# Patient Record
Sex: MALE | Race: WHITE | NOT HISPANIC OR LATINO | Employment: FULL TIME | RURAL
[De-identification: names, ages, dates, MRNs, and addresses within clinical notes are randomized per-mention and may not be internally consistent; named-entity substitution may affect disease eponyms.]

---

## 2022-04-15 ENCOUNTER — HOSPITAL ENCOUNTER (OUTPATIENT)
Facility: HOSPITAL | Age: 27
Discharge: HOME | End: 2022-04-17
Admitting: SURGERY
Payer: MEDICAID

## 2022-04-15 ENCOUNTER — ANESTHESIA EVENT (OUTPATIENT)
Dept: SURGERY | Facility: HOSPITAL | Age: 27
End: 2022-04-15
Payer: MEDICAID

## 2022-04-15 DIAGNOSIS — K37 APPENDICITIS, UNSPECIFIED APPENDICITIS TYPE: ICD-10-CM

## 2022-04-15 DIAGNOSIS — K35.30 ACUTE APPENDICITIS WITH LOCALIZED PERITONITIS, WITHOUT PERFORATION, ABSCESS, OR GANGRENE: Primary | ICD-10-CM

## 2022-04-15 DIAGNOSIS — K35.30: ICD-10-CM

## 2022-04-15 LAB
ALBUMIN SERPL BCP-MCNC: 3.9 G/DL (ref 3.5–5)
ALBUMIN/GLOB SERPL: 1.1 {RATIO}
ALP SERPL-CCNC: 76 U/L (ref 45–115)
ALT SERPL W P-5'-P-CCNC: 41 U/L (ref 16–61)
ANION GAP SERPL CALCULATED.3IONS-SCNC: 15 MMOL/L (ref 7–16)
AST SERPL W P-5'-P-CCNC: 22 U/L (ref 15–37)
BASOPHILS # BLD AUTO: 0.02 K/UL (ref 0–0.2)
BASOPHILS NFR BLD AUTO: 0.2 % (ref 0–1)
BILIRUB SERPL-MCNC: 0.3 MG/DL (ref 0–1.2)
BUN SERPL-MCNC: 7 MG/DL (ref 7–18)
BUN/CREAT SERPL: 9 (ref 6–20)
CALCIUM SERPL-MCNC: 9 MG/DL (ref 8.5–10.1)
CHLORIDE SERPL-SCNC: 103 MMOL/L (ref 98–107)
CO2 SERPL-SCNC: 28 MMOL/L (ref 21–32)
CREAT SERPL-MCNC: 0.75 MG/DL (ref 0.7–1.3)
DIFFERENTIAL METHOD BLD: ABNORMAL
EOSINOPHIL # BLD AUTO: 0.1 K/UL (ref 0–0.5)
EOSINOPHIL NFR BLD AUTO: 0.8 % (ref 1–4)
ERYTHROCYTE [DISTWIDTH] IN BLOOD BY AUTOMATED COUNT: 13 % (ref 11.5–14.5)
GLOBULIN SER-MCNC: 3.6 G/DL (ref 2–4)
GLUCOSE SERPL-MCNC: 96 MG/DL (ref 74–106)
HCT VFR BLD AUTO: 40.2 % (ref 40–54)
HGB BLD-MCNC: 14 G/DL (ref 13.5–18)
IMM GRANULOCYTES # BLD AUTO: 0.05 K/UL (ref 0–0.04)
IMM GRANULOCYTES NFR BLD: 0.4 % (ref 0–0.4)
LIPASE SERPL-CCNC: 94 U/L (ref 73–393)
LYMPHOCYTES # BLD AUTO: 2.23 K/UL (ref 1–4.8)
LYMPHOCYTES NFR BLD AUTO: 16.9 % (ref 27–41)
MCH RBC QN AUTO: 30.2 PG (ref 27–31)
MCHC RBC AUTO-ENTMCNC: 34.8 G/DL (ref 32–36)
MCV RBC AUTO: 86.8 FL (ref 80–96)
MONOCYTES # BLD AUTO: 1.08 K/UL (ref 0–0.8)
MONOCYTES NFR BLD AUTO: 8.2 % (ref 2–6)
MPC BLD CALC-MCNC: 8.3 FL (ref 9.4–12.4)
NEUTROPHILS # BLD AUTO: 9.71 K/UL (ref 1.8–7.7)
NEUTROPHILS NFR BLD AUTO: 73.5 % (ref 53–65)
NRBC # BLD AUTO: 0 X10E3/UL
NRBC, AUTO (.00): 0 %
PLATELET # BLD AUTO: 319 K/UL (ref 150–400)
POTASSIUM SERPL-SCNC: 4.1 MMOL/L (ref 3.5–5.1)
PROT SERPL-MCNC: 7.5 G/DL (ref 6.4–8.2)
RBC # BLD AUTO: 4.63 M/UL (ref 4.6–6.2)
SARS-COV-2 RDRP RESP QL NAA+PROBE: NEGATIVE
SODIUM SERPL-SCNC: 142 MMOL/L (ref 136–145)
WBC # BLD AUTO: 13.19 K/UL (ref 4.5–11)

## 2022-04-15 PROCEDURE — 27000509 HC TUBE SALEM SUMP ANY SIZE: Performed by: ANESTHESIOLOGY

## 2022-04-15 PROCEDURE — 27000510 HC BLANKET BAIR HUGGER ANY SIZE: Performed by: ANESTHESIOLOGY

## 2022-04-15 PROCEDURE — 87635 SARS-COV-2 COVID-19 AMP PRB: CPT | Performed by: NURSE PRACTITIONER

## 2022-04-15 PROCEDURE — 88304 TISSUE EXAM BY PATHOLOGIST: CPT | Mod: SUR | Performed by: SURGERY

## 2022-04-15 PROCEDURE — 36000708 HC OR TIME LEV III 1ST 15 MIN: Performed by: SURGERY

## 2022-04-15 PROCEDURE — D9220A PRA ANESTHESIA: ICD-10-PCS | Mod: ANES,,, | Performed by: ANESTHESIOLOGY

## 2022-04-15 PROCEDURE — 85025 COMPLETE CBC W/AUTO DIFF WBC: CPT | Performed by: NURSE PRACTITIONER

## 2022-04-15 PROCEDURE — 44970 LAPAROSCOPY APPENDECTOMY: CPT | Mod: ,,, | Performed by: SURGERY

## 2022-04-15 PROCEDURE — 99283 EMERGENCY DEPT VISIT LOW MDM: CPT | Mod: ,,, | Performed by: NURSE PRACTITIONER

## 2022-04-15 PROCEDURE — 25500020 PHARM REV CODE 255: Performed by: NURSE PRACTITIONER

## 2022-04-15 PROCEDURE — 83690 ASSAY OF LIPASE: CPT | Performed by: NURSE PRACTITIONER

## 2022-04-15 PROCEDURE — 25000003 PHARM REV CODE 250

## 2022-04-15 PROCEDURE — 71000033 HC RECOVERY, INTIAL HOUR: Performed by: SURGERY

## 2022-04-15 PROCEDURE — 27000689 HC BLADE LARYNGOSCOPE ANY SIZE: Performed by: ANESTHESIOLOGY

## 2022-04-15 PROCEDURE — 27000716 HC OXISENSOR PROBE, ANY SIZE: Performed by: ANESTHESIOLOGY

## 2022-04-15 PROCEDURE — D9220A PRA ANESTHESIA: Mod: CRNA,,,

## 2022-04-15 PROCEDURE — 25000003 PHARM REV CODE 250: Performed by: SURGERY

## 2022-04-15 PROCEDURE — 00840 ANES IPER PX LOWER ABD NOS: CPT | Performed by: SURGERY

## 2022-04-15 PROCEDURE — 99285 EMERGENCY DEPT VISIT HI MDM: CPT | Mod: 25

## 2022-04-15 PROCEDURE — 63600175 PHARM REV CODE 636 W HCPCS

## 2022-04-15 PROCEDURE — 37000008 HC ANESTHESIA 1ST 15 MINUTES: Performed by: SURGERY

## 2022-04-15 PROCEDURE — 27000165 HC TUBE, ETT CUFFED: Performed by: ANESTHESIOLOGY

## 2022-04-15 PROCEDURE — 27000655: Performed by: ANESTHESIOLOGY

## 2022-04-15 PROCEDURE — 27000260 *HC AIRWAY ORAL: Performed by: ANESTHESIOLOGY

## 2022-04-15 PROCEDURE — D9220A PRA ANESTHESIA: Mod: ANES,,, | Performed by: ANESTHESIOLOGY

## 2022-04-15 PROCEDURE — 63600175 PHARM REV CODE 636 W HCPCS: Performed by: ANESTHESIOLOGY

## 2022-04-15 PROCEDURE — 36415 COLL VENOUS BLD VENIPUNCTURE: CPT | Performed by: NURSE PRACTITIONER

## 2022-04-15 PROCEDURE — 80053 COMPREHEN METABOLIC PANEL: CPT | Performed by: NURSE PRACTITIONER

## 2022-04-15 PROCEDURE — 27201423 OPTIME MED/SURG SUP & DEVICES STERILE SUPPLY: Performed by: SURGERY

## 2022-04-15 PROCEDURE — 87070 CULTURE OTHR SPECIMN AEROBIC: CPT | Performed by: SURGERY

## 2022-04-15 PROCEDURE — 36000709 HC OR TIME LEV III EA ADD 15 MIN: Performed by: SURGERY

## 2022-04-15 PROCEDURE — D9220A PRA ANESTHESIA: ICD-10-PCS | Mod: CRNA,,,

## 2022-04-15 PROCEDURE — 87075 CULTR BACTERIA EXCEPT BLOOD: CPT | Performed by: SURGERY

## 2022-04-15 PROCEDURE — 99283 PR EMERGENCY DEPT VISIT,LEVEL III: ICD-10-PCS | Mod: ,,, | Performed by: NURSE PRACTITIONER

## 2022-04-15 PROCEDURE — 44970 PR LAP,APPENDECTOMY: ICD-10-PCS | Mod: ,,, | Performed by: SURGERY

## 2022-04-15 PROCEDURE — C1729 CATH, DRAINAGE: HCPCS | Performed by: SURGERY

## 2022-04-15 PROCEDURE — 37000009 HC ANESTHESIA EA ADD 15 MINS: Performed by: SURGERY

## 2022-04-15 PROCEDURE — 88304 SURGICAL PATHOLOGY: ICD-10-PCS | Mod: 26,,, | Performed by: PATHOLOGY

## 2022-04-15 PROCEDURE — 88304 TISSUE EXAM BY PATHOLOGIST: CPT | Mod: 26,,, | Performed by: PATHOLOGY

## 2022-04-15 RX ORDER — MORPHINE SULFATE 10 MG/ML
4 INJECTION INTRAMUSCULAR; INTRAVENOUS; SUBCUTANEOUS EVERY 4 HOURS PRN
Status: DISCONTINUED | OUTPATIENT
Start: 2022-04-15 | End: 2022-04-15

## 2022-04-15 RX ORDER — FENTANYL CITRATE 50 UG/ML
INJECTION, SOLUTION INTRAMUSCULAR; INTRAVENOUS
Status: DISCONTINUED | OUTPATIENT
Start: 2022-04-15 | End: 2022-04-15

## 2022-04-15 RX ORDER — HYDROMORPHONE HYDROCHLORIDE 2 MG/ML
0.5 INJECTION, SOLUTION INTRAMUSCULAR; INTRAVENOUS; SUBCUTANEOUS EVERY 5 MIN PRN
Status: DISCONTINUED | OUTPATIENT
Start: 2022-04-15 | End: 2022-04-15 | Stop reason: HOSPADM

## 2022-04-15 RX ORDER — ROCURONIUM BROMIDE 10 MG/ML
INJECTION, SOLUTION INTRAVENOUS
Status: DISCONTINUED | OUTPATIENT
Start: 2022-04-15 | End: 2022-04-15

## 2022-04-15 RX ORDER — BUPIVACAINE HYDROCHLORIDE 2.5 MG/ML
INJECTION, SOLUTION EPIDURAL; INFILTRATION; INTRACAUDAL
Status: DISCONTINUED | OUTPATIENT
Start: 2022-04-15 | End: 2022-04-15 | Stop reason: HOSPADM

## 2022-04-15 RX ORDER — MEPERIDINE HYDROCHLORIDE 25 MG/ML
25 INJECTION INTRAMUSCULAR; INTRAVENOUS; SUBCUTANEOUS EVERY 10 MIN PRN
Status: DISCONTINUED | OUTPATIENT
Start: 2022-04-15 | End: 2022-04-15 | Stop reason: HOSPADM

## 2022-04-15 RX ORDER — SODIUM CHLORIDE 450 MG/100ML
INJECTION, SOLUTION INTRAVENOUS CONTINUOUS
Status: DISCONTINUED | OUTPATIENT
Start: 2022-04-15 | End: 2022-04-17 | Stop reason: HOSPADM

## 2022-04-15 RX ORDER — KETOROLAC TROMETHAMINE 30 MG/ML
INJECTION, SOLUTION INTRAMUSCULAR; INTRAVENOUS
Status: DISCONTINUED | OUTPATIENT
Start: 2022-04-15 | End: 2022-04-15

## 2022-04-15 RX ORDER — DIPHENHYDRAMINE HYDROCHLORIDE 50 MG/ML
25 INJECTION INTRAMUSCULAR; INTRAVENOUS EVERY 6 HOURS PRN
Status: DISCONTINUED | OUTPATIENT
Start: 2022-04-15 | End: 2022-04-15 | Stop reason: HOSPADM

## 2022-04-15 RX ORDER — HYDROCODONE BITARTRATE AND ACETAMINOPHEN 7.5; 325 MG/1; MG/1
1 TABLET ORAL EVERY 6 HOURS PRN
Status: COMPLETED | OUTPATIENT
Start: 2022-04-15 | End: 2022-04-16

## 2022-04-15 RX ORDER — ONDANSETRON 2 MG/ML
INJECTION INTRAMUSCULAR; INTRAVENOUS
Status: DISCONTINUED | OUTPATIENT
Start: 2022-04-15 | End: 2022-04-15

## 2022-04-15 RX ORDER — MORPHINE SULFATE 10 MG/ML
4 INJECTION INTRAMUSCULAR; INTRAVENOUS; SUBCUTANEOUS EVERY 5 MIN PRN
Status: DISCONTINUED | OUTPATIENT
Start: 2022-04-15 | End: 2022-04-15 | Stop reason: HOSPADM

## 2022-04-15 RX ORDER — ONDANSETRON 2 MG/ML
4 INJECTION INTRAMUSCULAR; INTRAVENOUS EVERY 12 HOURS PRN
Status: DISCONTINUED | OUTPATIENT
Start: 2022-04-15 | End: 2022-04-17 | Stop reason: HOSPADM

## 2022-04-15 RX ORDER — ACETAMINOPHEN 325 MG/1
650 TABLET ORAL EVERY 8 HOURS PRN
Status: DISCONTINUED | OUTPATIENT
Start: 2022-04-15 | End: 2022-04-16

## 2022-04-15 RX ORDER — MORPHINE SULFATE 4 MG/ML
4 INJECTION, SOLUTION INTRAMUSCULAR; INTRAVENOUS EVERY 4 HOURS PRN
Status: DISCONTINUED | OUTPATIENT
Start: 2022-04-15 | End: 2022-04-17 | Stop reason: HOSPADM

## 2022-04-15 RX ORDER — PROPOFOL 10 MG/ML
VIAL (ML) INTRAVENOUS
Status: DISCONTINUED | OUTPATIENT
Start: 2022-04-15 | End: 2022-04-15

## 2022-04-15 RX ORDER — DEXAMETHASONE SODIUM PHOSPHATE 4 MG/ML
INJECTION, SOLUTION INTRA-ARTICULAR; INTRALESIONAL; INTRAMUSCULAR; INTRAVENOUS; SOFT TISSUE
Status: DISCONTINUED | OUTPATIENT
Start: 2022-04-15 | End: 2022-04-15

## 2022-04-15 RX ORDER — MIDAZOLAM HYDROCHLORIDE 1 MG/ML
INJECTION INTRAMUSCULAR; INTRAVENOUS
Status: DISCONTINUED | OUTPATIENT
Start: 2022-04-15 | End: 2022-04-15

## 2022-04-15 RX ORDER — ONDANSETRON 2 MG/ML
4 INJECTION INTRAMUSCULAR; INTRAVENOUS DAILY PRN
Status: DISCONTINUED | OUTPATIENT
Start: 2022-04-15 | End: 2022-04-15 | Stop reason: HOSPADM

## 2022-04-15 RX ORDER — LIDOCAINE HYDROCHLORIDE 20 MG/ML
INJECTION, SOLUTION EPIDURAL; INFILTRATION; INTRACAUDAL; PERINEURAL
Status: DISCONTINUED | OUTPATIENT
Start: 2022-04-15 | End: 2022-04-15

## 2022-04-15 RX ORDER — CEFAZOLIN SODIUM 1 G/3ML
INJECTION, POWDER, FOR SOLUTION INTRAMUSCULAR; INTRAVENOUS
Status: DISCONTINUED | OUTPATIENT
Start: 2022-04-15 | End: 2022-04-15

## 2022-04-15 RX ADMIN — LIDOCAINE HYDROCHLORIDE 80 MG: 20 INJECTION, SOLUTION INTRAVENOUS at 04:04

## 2022-04-15 RX ADMIN — PROPOFOL 200 MG: 10 INJECTION, EMULSION INTRAVENOUS at 04:04

## 2022-04-15 RX ADMIN — FENTANYL CITRATE 100 MCG: 50 INJECTION INTRAMUSCULAR; INTRAVENOUS at 04:04

## 2022-04-15 RX ADMIN — SUGAMMADEX 200 MG: 100 INJECTION, SOLUTION INTRAVENOUS at 05:04

## 2022-04-15 RX ADMIN — MIDAZOLAM 2 MG: 1 INJECTION INTRAMUSCULAR; INTRAVENOUS at 04:04

## 2022-04-15 RX ADMIN — KETOROLAC TROMETHAMINE 30 MG: 30 INJECTION, SOLUTION INTRAMUSCULAR at 05:04

## 2022-04-15 RX ADMIN — SODIUM CHLORIDE: 4.5 INJECTION, SOLUTION INTRAVENOUS at 09:04

## 2022-04-15 RX ADMIN — ONDANSETRON 4 MG: 2 INJECTION INTRAMUSCULAR; INTRAVENOUS at 04:04

## 2022-04-15 RX ADMIN — IOPAMIDOL 100 ML: 755 INJECTION, SOLUTION INTRAVENOUS at 03:04

## 2022-04-15 RX ADMIN — SODIUM CHLORIDE: 9 INJECTION, SOLUTION INTRAVENOUS at 04:04

## 2022-04-15 RX ADMIN — DEXAMETHASONE SODIUM PHOSPHATE 8 MG: 4 INJECTION, SOLUTION INTRA-ARTICULAR; INTRALESIONAL; INTRAMUSCULAR; INTRAVENOUS; SOFT TISSUE at 04:04

## 2022-04-15 RX ADMIN — SODIUM CHLORIDE, POTASSIUM CHLORIDE, SODIUM LACTATE AND CALCIUM CHLORIDE: 600; 310; 30; 20 INJECTION, SOLUTION INTRAVENOUS at 05:04

## 2022-04-15 RX ADMIN — KETOROLAC TROMETHAMINE 30 MG: 30 INJECTION, SOLUTION INTRAMUSCULAR; INTRAVENOUS at 05:04

## 2022-04-15 RX ADMIN — MORPHINE SULFATE 4 MG: 4 INJECTION INTRAVENOUS at 07:04

## 2022-04-15 RX ADMIN — ROCURONIUM BROMIDE 50 MG: 10 INJECTION, SOLUTION INTRAVENOUS at 04:04

## 2022-04-15 RX ADMIN — FENTANYL CITRATE 50 MCG: 50 INJECTION INTRAMUSCULAR; INTRAVENOUS at 04:04

## 2022-04-15 RX ADMIN — CEFAZOLIN 2 G: 1 INJECTION, POWDER, FOR SOLUTION INTRAMUSCULAR; INTRAVENOUS; PARENTERAL at 04:04

## 2022-04-15 NOTE — ASSESSMENT & PLAN NOTE
Risks and benefits of surgery discussed with the patient to include infection, bleeding, open conversion, appendiceal stump leak, cardiac or pulmonary complications, and the unforeseen.  Patient expresses understanding wishes to proceed.

## 2022-04-15 NOTE — SUBJECTIVE & OBJECTIVE
No current facility-administered medications on file prior to encounter.     No current outpatient medications on file prior to encounter.       Review of patient's allergies indicates:  No Known Allergies    History reviewed. No pertinent past medical history.  History reviewed. No pertinent surgical history.  Family History    None       Tobacco Use    Smoking status: Never Smoker    Smokeless tobacco: Current User    Tobacco comment: occasionally   Substance and Sexual Activity    Alcohol use: Never    Drug use: Never    Sexual activity: Not Currently     Review of Systems   Constitutional:  Positive for appetite change.   Gastrointestinal:  Positive for nausea.   All other systems reviewed and are negative.  Objective:     Vital Signs (Most Recent):  Temp: 98.3 °F (36.8 °C) (04/15/22 1327)  Pulse: 89 (04/15/22 1327)  Resp: 20 (04/15/22 1327)  BP: 138/81 (04/15/22 1327)  SpO2: 100 % (04/15/22 1327) Vital Signs (24h Range):  Temp:  [98.3 °F (36.8 °C)] 98.3 °F (36.8 °C)  Pulse:  [89] 89  Resp:  [20] 20  SpO2:  [100 %] 100 %  BP: (138)/(81) 138/81     Weight: 95.3 kg (210 lb)  Body mass index is 30.13 kg/m².    Physical Exam  Constitutional:       General: He is not in acute distress.     Appearance: He is normal weight.   HENT:      Nose: Nose normal.   Eyes:      General: No scleral icterus.  Cardiovascular:      Rate and Rhythm: Normal rate and regular rhythm.      Pulses: Normal pulses.   Pulmonary:      Breath sounds: Normal breath sounds.   Abdominal:      General: There is no distension.      Palpations: Abdomen is soft. There is no mass.      Hernia: No hernia is present.      Comments: Tenderness, right lower quadrant; + Rovsing sign   Musculoskeletal:         General: No swelling or tenderness.   Lymphadenopathy:      Cervical: No cervical adenopathy.   Neurological:      General: No focal deficit present.      Mental Status: He is alert.      Motor: No weakness.   Psychiatric:         Mood and Affect:  Mood normal.       Significant Labs:  I have reviewed all pertinent lab results within the past 24 hours.  CBC:   Recent Labs   Lab 04/15/22  1407   WBC 13.19*   RBC 4.63   HGB 14.0   HCT 40.2      MCV 86.8   MCH 30.2   MCHC 34.8     BMP:   Recent Labs   Lab 04/15/22  1407   GLU 96      K 4.1      CO2 28   BUN 7   CREATININE 0.75   CALCIUM 9.0       Significant Diagnostics:  I have reviewed all pertinent imaging results/findings within the past 24 hours.  CT: I have reviewed all pertinent results/findings within the past 24 hours and my personal findings are:  per HPI

## 2022-04-15 NOTE — TRANSFER OF CARE
"Anesthesia Transfer of Care Note    Patient: Best Arguelles    Procedure(s) Performed: Procedure(s) (LRB):  APPENDECTOMY, LAPAROSCOPIC (N/A)    Patient location: PACU    Anesthesia Type: general    Transport from OR: Transported from OR on 6-10 L/min O2 by face mask with adequate spontaneous ventilation    Post pain: adequate analgesia    Post assessment: no apparent anesthetic complications    Post vital signs: stable    Level of consciousness: responds to stimulation and sedated    Nausea/Vomiting: no nausea/vomiting    Complications: none    Transfer of care protocol was followed      Last vitals:   Visit Vitals  /60 (BP Location: Left arm, Patient Position: Lying)   Pulse 78   Temp 36.6 °C (97.9 °F) (Oral)   Resp 12   Ht 5' 10" (1.778 m)   Wt 95.3 kg (210 lb)   SpO2 100%   BMI 30.13 kg/m²     "

## 2022-04-15 NOTE — ED PROVIDER NOTES
Encounter Date: 4/15/2022       History     Chief Complaint   Patient presents with    Abdominal Pain     Started last night     26-year-old male presents to the emergency department to be evaluated for abdominal pain that began at 1 o'clock this morning. He has had some nausea but has not vomited any. Denies any diarrhea, constipation, fever, chills, dysuria, cough, sore throat, known sick contacts.    The history is provided by the patient.   Abdominal Pain  The current episode started several hours ago. The abdominal pain is located in the RLQ. The other symptoms of the illness include nausea. The other symptoms of the illness do not include fever, fatigue, jaundice, melena, vomiting, diarrhea, dysuria, hematemesis or hematochezia.     Review of patient's allergies indicates:  No Known Allergies  History reviewed. No pertinent past medical history.  History reviewed. No pertinent surgical history.  History reviewed. No pertinent family history.  Social History     Tobacco Use    Smoking status: Never Smoker    Smokeless tobacco: Current User    Tobacco comment: occasionally   Substance Use Topics    Alcohol use: Never    Drug use: Never     Review of Systems   Constitutional: Negative for fatigue and fever.   Gastrointestinal: Positive for abdominal pain and nausea. Negative for diarrhea, hematemesis, hematochezia, jaundice, melena and vomiting.   Genitourinary: Negative for dysuria.   All other systems reviewed and are negative.      Physical Exam     Initial Vitals [04/15/22 1327]   BP Pulse Resp Temp SpO2   138/81 89 20 98.3 °F (36.8 °C) 100 %      MAP       --         Physical Exam    Vitals reviewed.  Constitutional: He appears well-developed and well-nourished.   Neck: Neck supple.   Cardiovascular: Normal rate and regular rhythm.   Abdominal: Abdomen is soft. Bowel sounds are normal. There is abdominal tenderness (moderate tenderness right lower abdomen).   Musculoskeletal:         General:  Normal range of motion.      Cervical back: Neck supple.     Neurological: He is alert and oriented to person, place, and time. He has normal strength. GCS score is 15. GCS eye subscore is 4. GCS verbal subscore is 5. GCS motor subscore is 6.   Skin: Skin is warm and dry. Capillary refill takes less than 2 seconds.   Psychiatric: He has a normal mood and affect.         Medical Screening Exam   See Full Note    ED Course   Procedures  Labs Reviewed   CBC WITH DIFFERENTIAL - Abnormal; Notable for the following components:       Result Value    WBC 13.19 (*)     MPV 8.3 (*)     Neutrophils % 73.5 (*)     Lymphocytes % 16.9 (*)     Monocytes % 8.2 (*)     Eosinophils % 0.8 (*)     Neutrophils, Abs 9.71 (*)     Monocytes, Absolute 1.08 (*)     Immature Granulocytes, Absolute 0.05 (*)     All other components within normal limits   LIPASE - Normal   COMPREHENSIVE METABOLIC PANEL   CBC W/ AUTO DIFFERENTIAL    Narrative:     The following orders were created for panel order CBC auto differential.  Procedure                               Abnormality         Status                     ---------                               -----------         ------                     CBC with Differential[[id number]]        Abnormal            Final result                 Please view results for these tests on the individual orders.   EXTRA TUBES    Narrative:     The following orders were created for panel order EXTRA TUBES.  Procedure                               Abnormality         Status                     ---------                               -----------         ------                     Light Blue Top Hold[[id number]]                              In process                 Light Green Top Hold[[id number]]                             In process                   Please view results for these tests on the individual orders.   LIGHT BLUE TOP HOLD   LIGHT GREEN TOP HOLD   SARS-COV-2 RNA AMPLIFICATION, QUAL          Imaging Results           CT Abdomen Pelvis With Contrast (Final result)  Result time 04/15/22 15:09:54    Final result by Ana Mak MD (04/15/22 15:09:54)                 Impression:      The appendix is dilated at 12.5 mm, normal is less than 6 mm.  The wall is enhancing.  The tip appears intact.  Mild inflammation around the appendix, and fluid in the pre rectal space.  There is air in the lumen of the appendix but with the other findings present, appendicitis is suspected.      Electronically signed by: Ana Mak  Date:    04/15/2022  Time:    15:09             Narrative:    EXAMINATION:  CT ABDOMEN PELVIS WITH CONTRAST    CLINICAL HISTORY:  right abdominal pain;    TECHNIQUE:  Low dose axial images, sagittal and coronal reformations were obtained from the lung bases to the pubic symphysis following the IV administration of 100 mL of Isovue 370.  No oral contrast.    COMPARISON:  None.    FINDINGS:  The heart is normal in size.  The lung bases are clear.  The liver, spleen, adrenal glands, pancreas, and kidneys present a normal appearance.  The ureters are normal in course and caliber.  The urinary bladder presents a normal appearance.  The prostate gland is normal in size.    There is a small amount of free fluid in the pelvis.    The abdominal aorta is of normal caliber.  There are shotty lymph nodes within the retroperitoneum, iliac chain and inguinal areas.    The gastric contour is normal.  The loops of small intestine are not dilated.  The appendix is dilated, with a diameter of 12.5 mm.  Its wall enhances, and there is slight stranding in the surrounding fat.  The tip appears intact and there is gas and fecal material within its lumen.  No appendicoliths seen.  Osseous structures are unremarkable.                               XR ABDOMEN, ACUTE 2 OR MORE VIEWS WITH CHEST (Final result)  Result time 04/15/22 14:13:41    Final result by Jordan Zavaleta II, MD (04/15/22 14:13:41)                 Impression:      No  evidence of abnormality demonstrated      Electronically signed by: Jordan Zavaleta  Date:    04/15/2022  Time:    14:13             Narrative:    EXAMINATION:  XR ABDOMEN ACUTE 2 OR MORE VIEWS WITH CHEST    CLINICAL HISTORY:  Abdominal pain right lower abdominal pain;    COMPARISON:  None available    FINDINGS:  No free fluid or free air seen.  The bowel gas pattern appears within normal limits.  No abnormal calcifications are present. Chest shows no evidence of abnormality. No other abnormality is identified.                                 Medications   iopamidoL (ISOVUE-370) injection 100 mL (100 mLs Intravenous Given 4/15/22 1500)                       Clinical Impression:   Final diagnoses:  [K37] Appendicitis, unspecified appendicitis type          ED Disposition Condition    Admit               Natalie WESLEY López, [facility]  04/15/22 8372

## 2022-04-15 NOTE — LETTER
April 17, 2022         [address] [place] [id number]  Phone: [phone]  Fax: [phone]       Patient: Best Arguelles   YOB: 1995  Date of Visit: 04/15/2022    To Whom It May Concern:    Sheyla Arguelles  was at [facility] on 04/15/2022 and had surgery to have removal of appendix. The patient may return to work/school on no restrictions. If you have any questions or concerns, or if I can be of further assistance, please do not hesitate to contact me.    Sincerely,    Feliciano López, RN

## 2022-04-15 NOTE — PROGRESS NOTES
REC'ED TO RR ASLEEP, ORAL AIRWAY IN PLACE. O2 VIA FM. RESP. DEEP UNLABORED. HOB ELEVATED. ABDOMEN SOFT WITH OP-SITES D/I. I V INFUSING WELL RIGHT AC 20G. CATH. SCD HOSE IN PROGRESS. SEE FLOW SHEET. NO DISTRESS NOTED.

## 2022-04-15 NOTE — PROVIDER PROGRESS NOTES - EMERGENCY DEPT.
Encounter Date: 4/15/2022    ED Physician Progress Notes        Discussed with , he will evaluate the patient in the ED

## 2022-04-15 NOTE — OP NOTE
[facility] - Periop Services     Operative Note    SUMMARY     Date of Procedure: 4/15/2022     Procedure: Procedure(s) (LRB):  APPENDECTOMY, LAPAROSCOPIC (N/A)     Surgeon(s) and Role:     * Lenadro Clarke MD - Primary    Assisting Surgeon: None    Pre-Operative Diagnosis: Acute appendicitis with localized peritonitis, without perforation, abscess, or gangrene [K35.30]    Post-Operative Diagnosis: Post-Op Diagnosis Codes:     * Acute appendicitis with localized peritonitis, without perforation, abscess, or gangrene [K35.30]    Anesthesia: General    Technical Procedures Used: The patient was brought to the operating room and placed in supine position. General anesthesia was administered. The patient's abdomen was prepped and draped in standard sterile fashion.  Supraumbilical incision was performed a #11 blade and dissection was carried out through subcutaneous tissue to the level of the fascia. Fascia was sharply transected. Peritoneum was carefully entered. Jacob cannula was inserted and CO2 pneumoperitoneum was established. 5 mm trochars were then placed in the lower midline in the right lateral abdomen and right upper quadrant under direct visualization. The appendix was identified and was mildly inflamed, without perforation. There was no other pathology noted.   Exploration was then carried out, and mild amount of peritoneal fluid collection the cul-de-sac was evacuated to be sent for cultures.  The terminal ileum was then dissected away from the psoas muscle.  Subsequent to this, the mesoappendix was dissected and transected with the LigaSure device.  Once the appendix had been dissected down to the base, appendix was transected with the endoscopic stapler. All port sites were infiltrated with local at the peritoneal, fascial and  the skin level. The appendix was removed through the supraumbilical port, inside an Endo-Catch.  Fascial closure at the umbilicus was then performed with a figure-of-eight  PDS using the Endo-close.  CO2 pneumoperitoneum was allowed to escape.  All port sites were closed at skin level with subcuticular Vicryl. Steri-Strips were placed and sterile dressings were applied. The patient was awakened from anesthesia in stable condition.    Description of the Findings of the Procedure:    Appendix was markedly dilated, and contained a mildly erythematous/injected appearing serosa, but there was no evidence of gangrene or fibrin exudates present.  There was peritoneal fluid collected within the cul-de-sac, but no evidence of pus.  Some of this fluid was obtained for culture, however.  There was no evidence of a Meckel diverticula, and no evidence of colitis.  The gallbladder was distended but did not appear to be inflamed.  An appendectomy was performed.    Assistant(s): None     Complications: No    Estimated Blood Loss (EBL): * No values recorded between 4/15/2022  5:11 PM and 4/15/2022  5:55 PM *           Implants: * No implants in log *    Specimens:   Specimen (24h ago, onward)             Start     Ordered    04/15/22 1731  Surgical Pathology  RELEASE UPON ORDERING         04/15/22 1731                 Condition: Good      Complications:  None

## 2022-04-15 NOTE — PROVIDER PROGRESS NOTES - EMERGENCY DEPT.
Encounter Date: 4/15/2022    ED Physician Progress Notes          at bedside, evaluated the patient; will take to surgery

## 2022-04-15 NOTE — HPI
26-year-old male patient who presents with a history of abdominal pain that was onset last night around midnight.  Patient states the pain is in the right lower abdomen, and has essentially been there from the start.  He has had nausea without emesis.  Patient denies constipation or diarrhea.  The pain has been very persistent and he presented to the emergency department for further evaluation.  Patient had a white count of 13K and a CT was performed revealing a dilated fluid-filled appendix with mild amount of inflammatory change surrounding it.  There was also noted to be a mild amount of fluid in the pre rectal space.

## 2022-04-15 NOTE — PROGRESS NOTES
RELEASED TO FLOOR RN AWAKE, ALERT. ABDOMEN SOFT. DRESSINGS D/I. NO DISTRESS NOTED. V/S 116/55-84-%-97.5 TEMP. WIFE NOTIFIED OF ROOM NUMBER.

## 2022-04-15 NOTE — ED TRIAGE NOTES
Started having sharp pain around his umbilicus last night. Does not remembering lifting anything heavy or injury.

## 2022-04-15 NOTE — ANESTHESIA PREPROCEDURE EVALUATION
04/15/2022  Best Arguelles is a 26 y.o., male.      Pre-op Assessment    I have reviewed the Patient Summary Reports.    I have reviewed the NPO Status.   I have reviewed the Medications.     Review of Systems         Anesthesia Plan  Type of Anesthesia, risks & benefits discussed:    Anesthesia Type: Gen ETT  Intra-op Monitoring Plan: Standard ASA Monitors  Post Op Pain Control Plan: IV/PO Opioids PRN  Induction:  IV  Informed Consent: Informed consent signed with the Patient and all parties understand the risks and agree with anesthesia plan.  All questions answered.   ASA Score: 1    Ready For Surgery From Anesthesia Perspective.     .  NPO since 1000 except for clear liquids  No previous anesthesia; no family complications  NKDA    Hct 40    Acute appendicitis  GERD  Remote h/o asthma    Airway exam deferred (COVID precautions); adequate ROM at neck.

## 2022-04-15 NOTE — ANESTHESIA PROCEDURE NOTES
Intubation    Date/Time: 4/15/2022 4:46 PM  Performed by: Dave Freeman II, CRNA  Authorized by: Dawit Andre MD     Intubation:     Induction:  Rapid sequence induction    Intubated:  Postinduction    Mask Ventilation:  N/a    Attempts:  1    Attempted By:  CRNA    Method of Intubation:  Direct    Blade:  Dean 4    Laryngeal View Grade: Grade I - full view of cords      Difficult Airway Encountered?: No      Complications:  None    Airway Device:  Oral endotracheal tube    Airway Device Size:  7.5    Style/Cuff Inflation:  Cuffed    Inflation Amount (mL):  7    Tube secured:  23    Secured at:  The lips    Placement Verified By:  Capnometry    Complicating Factors:  None    Findings Post-Intubation:  BS equal bilateral and atraumatic/condition of teeth unchanged

## 2022-04-15 NOTE — H&P
[facility] - Emergency Department  General Surgery  History & Physical    Patient Name: Best Arguelles  MRN: [id number]  Admission Date: 4/15/2022  Attending Physician: Leandro Clarke MD  Primary Care Provider: Primary Doctor No    Patient information was obtained from patient and ER records.     Subjective:     Chief Complaint/Reason for Admission: abdominal pain/ appendicitis    History of Present Illness:  26-year-old male patient who presents with a history of abdominal pain that was onset last night around midnight.  Patient states the pain is in the right lower abdomen, and has essentially been there from the start.  He has had nausea without emesis.  Patient denies constipation or diarrhea.  The pain has been very persistent and he presented to the emergency department for further evaluation.  Patient had a white count of 13K and a CT was performed revealing a dilated fluid-filled appendix with mild amount of inflammatory change surrounding it.  There was also noted to be a mild amount of fluid in the pre rectal space.      No current facility-administered medications on file prior to encounter.     No current outpatient medications on file prior to encounter.       Review of patient's allergies indicates:  No Known Allergies    History reviewed. No pertinent past medical history.  History reviewed. No pertinent surgical history.  Family History    None       Tobacco Use    Smoking status: Never Smoker    Smokeless tobacco: Current User    Tobacco comment: occasionally   Substance and Sexual Activity    Alcohol use: Never    Drug use: Never    Sexual activity: Not Currently     Review of Systems   Constitutional:  Positive for appetite change.   Gastrointestinal:  Positive for nausea.   All other systems reviewed and are negative.  Objective:     Vital Signs (Most Recent):  Temp: 98.3 °F (36.8 °C) (04/15/22 1327)  Pulse: 89 (04/15/22 1327)  Resp: 20 (04/15/22 1327)  BP: 138/81 (04/15/22 1327)  SpO2:  100 % (04/15/22 1327) Vital Signs (24h Range):  Temp:  [98.3 °F (36.8 °C)] 98.3 °F (36.8 °C)  Pulse:  [89] 89  Resp:  [20] 20  SpO2:  [100 %] 100 %  BP: (138)/(81) 138/81     Weight: 95.3 kg (210 lb)  Body mass index is 30.13 kg/m².    Physical Exam  Constitutional:       General: He is not in acute distress.     Appearance: He is normal weight.   HENT:      Nose: Nose normal.   Eyes:      General: No scleral icterus.  Cardiovascular:      Rate and Rhythm: Normal rate and regular rhythm.      Pulses: Normal pulses.   Pulmonary:      Breath sounds: Normal breath sounds.   Abdominal:      General: There is no distension.      Palpations: Abdomen is soft. There is no mass.      Hernia: No hernia is present.      Comments: Tenderness, right lower quadrant; + Rovsing sign   Musculoskeletal:         General: No swelling or tenderness.   Lymphadenopathy:      Cervical: No cervical adenopathy.   Neurological:      General: No focal deficit present.      Mental Status: He is alert.      Motor: No weakness.   Psychiatric:         Mood and Affect: Mood normal.       Significant Labs:  I have reviewed all pertinent lab results within the past 24 hours.  CBC:   Recent Labs   Lab 04/15/22  1407   WBC 13.19*   RBC 4.63   HGB 14.0   HCT 40.2      MCV 86.8   MCH 30.2   MCHC 34.8     BMP:   Recent Labs   Lab 04/15/22  1407   GLU 96      K 4.1      CO2 28   BUN 7   CREATININE 0.75   CALCIUM 9.0       Significant Diagnostics:  I have reviewed all pertinent imaging results/findings within the past 24 hours.  CT: I have reviewed all pertinent results/findings within the past 24 hours and my personal findings are:  per HPI      Assessment/Plan:     Acute appendicitis with localized peritonitis, without perforation or abscess  Onset within previous 24 hours    Risks and benefits of surgery discussed with the patient to include infection, bleeding, open conversion, appendiceal stump leak, cardiac or pulmonary  complications, and the unforeseen.  Patient expresses understanding wishes to proceed.      VTE Risk Mitigation (From admission, onward)    None          Leandro Clarke MD  General Surgery  [facility] - Emergency Department

## 2022-04-16 ENCOUNTER — ANESTHESIA (OUTPATIENT)
Dept: SURGERY | Facility: HOSPITAL | Age: 27
End: 2022-04-16
Payer: MEDICAID

## 2022-04-16 LAB
BASOPHILS # BLD AUTO: 0.01 K/UL (ref 0–0.2)
BASOPHILS NFR BLD AUTO: 0.1 % (ref 0–1)
DIFFERENTIAL METHOD BLD: ABNORMAL
EOSINOPHIL # BLD AUTO: 0 K/UL (ref 0–0.5)
EOSINOPHIL NFR BLD AUTO: 0 % (ref 1–4)
ERYTHROCYTE [DISTWIDTH] IN BLOOD BY AUTOMATED COUNT: 13.1 % (ref 11.5–14.5)
HCT VFR BLD AUTO: 39.5 % (ref 40–54)
HGB BLD-MCNC: 13.2 G/DL (ref 13.5–18)
IMM GRANULOCYTES # BLD AUTO: 0.05 K/UL (ref 0–0.04)
IMM GRANULOCYTES NFR BLD: 0.4 % (ref 0–0.4)
LYMPHOCYTES # BLD AUTO: 0.92 K/UL (ref 1–4.8)
LYMPHOCYTES NFR BLD AUTO: 7.5 % (ref 27–41)
LYMPHOCYTES NFR BLD MANUAL: 9 % (ref 27–41)
MCH RBC QN AUTO: 30.5 PG (ref 27–31)
MCHC RBC AUTO-ENTMCNC: 33.4 G/DL (ref 32–36)
MCV RBC AUTO: 91.2 FL (ref 80–96)
MONOCYTES # BLD AUTO: 0.19 K/UL (ref 0–0.8)
MONOCYTES NFR BLD AUTO: 1.5 % (ref 2–6)
MONOCYTES NFR BLD MANUAL: 1 % (ref 2–6)
MPC BLD CALC-MCNC: 9.2 FL (ref 9.4–12.4)
NEUTROPHILS # BLD AUTO: 11.1 K/UL (ref 1.8–7.7)
NEUTROPHILS NFR BLD AUTO: 90.5 % (ref 53–65)
NEUTS BAND NFR BLD MANUAL: 2 % (ref 1–5)
NEUTS SEG NFR BLD MANUAL: 88 % (ref 50–62)
NRBC # BLD AUTO: 0 X10E3/UL
NRBC, AUTO (.00): 0 %
PLATELET # BLD AUTO: 306 K/UL (ref 150–400)
PLATELET MORPHOLOGY: NORMAL
RBC # BLD AUTO: 4.33 M/UL (ref 4.6–6.2)
RBC MORPH BLD: NORMAL
WBC # BLD AUTO: 12.27 K/UL (ref 4.5–11)

## 2022-04-16 PROCEDURE — 63600175 PHARM REV CODE 636 W HCPCS: Performed by: ANESTHESIOLOGY

## 2022-04-16 PROCEDURE — 36415 COLL VENOUS BLD VENIPUNCTURE: CPT | Performed by: SURGERY

## 2022-04-16 PROCEDURE — 85025 COMPLETE CBC W/AUTO DIFF WBC: CPT | Performed by: SURGERY

## 2022-04-16 PROCEDURE — 25000003 PHARM REV CODE 250: Performed by: SURGERY

## 2022-04-16 RX ORDER — HYDROCODONE BITARTRATE AND ACETAMINOPHEN 7.5; 325 MG/1; MG/1
1 TABLET ORAL EVERY 6 HOURS PRN
Status: DISCONTINUED | OUTPATIENT
Start: 2022-04-16 | End: 2022-04-17 | Stop reason: HOSPADM

## 2022-04-16 RX ADMIN — SODIUM CHLORIDE: 4.5 INJECTION, SOLUTION INTRAVENOUS at 05:04

## 2022-04-16 RX ADMIN — HYDROCODONE BITARTRATE AND ACETAMINOPHEN 1 TABLET: 7.5; 325 TABLET ORAL at 02:04

## 2022-04-16 RX ADMIN — MORPHINE SULFATE 4 MG: 4 INJECTION INTRAVENOUS at 10:04

## 2022-04-16 RX ADMIN — MORPHINE SULFATE 4 MG: 4 INJECTION INTRAVENOUS at 05:04

## 2022-04-16 RX ADMIN — HYDROCODONE BITARTRATE AND ACETAMINOPHEN 1 TABLET: 7.5; 325 TABLET ORAL at 09:04

## 2022-04-16 RX ADMIN — MORPHINE SULFATE 4 MG: 4 INJECTION INTRAVENOUS at 04:04

## 2022-04-16 NOTE — PROGRESS NOTES
[facility] - Orthopedic  General Surgery  Progress Note    Subjective:     History of Present Illness:   26-year-old male patient who presents with a history of abdominal pain that was onset last night around midnight.  Patient states the pain is in the right lower abdomen, and has essentially been there from the start.  He has had nausea without emesis.  Patient denies constipation or diarrhea.  The pain has been very persistent and he presented to the emergency department for further evaluation.  Patient had a white count of 13K and a CT was performed revealing a dilated fluid-filled appendix with mild amount of inflammatory change surrounding it.  There was also noted to be a mild amount of fluid in the pre rectal space.      Post-Op Info:  Procedure(s) (LRB):  APPENDECTOMY, LAPAROSCOPIC (N/A)   1 Day Post-Op     Interval History: s/p lap appy;  c/o incisional pain; tolerated solid food for lunch    Medications:  Continuous Infusions:   sodium chloride 0.45% 50 mL/hr at 04/15/22 2118     Scheduled Meds:  PRN Meds:acetaminophen, morphine, ondansetron     Review of patient's allergies indicates:  No Known Allergies  Objective:     Vital Signs (Most Recent):  Temp: 97.5 °F (36.4 °C) (04/16/22 1200)  Pulse: 79 (04/16/22 1200)  Resp: 20 (04/16/22 1444)  BP: (!) 98/50 (04/16/22 1200)  SpO2: 97 % (04/16/22 1200)   Vital Signs (24h Range):  Temp:  [96.8 °F (36 °C)-97.9 °F (36.6 °C)] 97.5 °F (36.4 °C)  Pulse:  [63-84] 79  Resp:  [12-20] 20  SpO2:  [96 %-100 %] 97 %  BP: ()/(50-75) 98/50     Weight: 95.3 kg (210 lb)  Body mass index is 30.13 kg/m².    Intake/Output - Last 3 Shifts         04/14 0700  04/15 0659 04/15 0700  04/16 0659 04/16 0700  04/17 0659    P.O.   720    I.V. (mL/kg)  485 (5.1)     IV Piggyback  1350     Total Intake(mL/kg)  1835 (19.3) 720 (7.6)    Net  +1835 +720                   Physical Exam  Cardiovascular:      Rate and Rhythm: Normal rate.   Pulmonary:      Effort: Pulmonary effort is  normal.      Breath sounds: Normal breath sounds.   Abdominal:      Palpations: Abdomen is soft.   Skin:     Comments: Incisions with dressings intact   Neurological:      General: No focal deficit present.      Mental Status: He is alert.       Significant Labs:  I have reviewed all pertinent lab results within the past 24 hours.  CBC:   Recent Labs   Lab 04/16/22  0345   WBC 12.27*   RBC 4.33*   HGB 13.2*   HCT 39.5*      MCV 91.2   MCH 30.5   MCHC 33.4     BMP:   Recent Labs   Lab 04/15/22  1407   GLU 96      K 4.1      CO2 28   BUN 7   CREATININE 0.75   CALCIUM 9.0       Significant Diagnostics:  I have reviewed all pertinent imaging results/findings within the past 24 hours.    Assessment/Plan:     Acute appendicitis with localized peritonitis, without perforation or abscess  POD 1  Continue postop care  Home tomorrow, if pain controlled        Leandro Clarke MD  General Surgery  [facility] - Orthopedic

## 2022-04-16 NOTE — SUBJECTIVE & OBJECTIVE
Interval History: s/p lap appy;  c/o incisional pain; tolerated solid food for lunch    Medications:  Continuous Infusions:   sodium chloride 0.45% 50 mL/hr at 04/15/22 2118     Scheduled Meds:  PRN Meds:acetaminophen, morphine, ondansetron     Review of patient's allergies indicates:  No Known Allergies  Objective:     Vital Signs (Most Recent):  Temp: 97.5 °F (36.4 °C) (04/16/22 1200)  Pulse: 79 (04/16/22 1200)  Resp: 20 (04/16/22 1444)  BP: (!) 98/50 (04/16/22 1200)  SpO2: 97 % (04/16/22 1200)   Vital Signs (24h Range):  Temp:  [96.8 °F (36 °C)-97.9 °F (36.6 °C)] 97.5 °F (36.4 °C)  Pulse:  [63-84] 79  Resp:  [12-20] 20  SpO2:  [96 %-100 %] 97 %  BP: ()/(50-75) 98/50     Weight: 95.3 kg (210 lb)  Body mass index is 30.13 kg/m².    Intake/Output - Last 3 Shifts         04/14 0700  04/15 0659 04/15 0700  04/16 0659 04/16 0700  04/17 0659    P.O.   720    I.V. (mL/kg)  485 (5.1)     IV Piggyback  1350     Total Intake(mL/kg)  1835 (19.3) 720 (7.6)    Net  +1835 +720                   Physical Exam  Cardiovascular:      Rate and Rhythm: Normal rate.   Pulmonary:      Effort: Pulmonary effort is normal.      Breath sounds: Normal breath sounds.   Abdominal:      Palpations: Abdomen is soft.   Skin:     Comments: Incisions with dressings intact   Neurological:      General: No focal deficit present.      Mental Status: He is alert.       Significant Labs:  I have reviewed all pertinent lab results within the past 24 hours.  CBC:   Recent Labs   Lab 04/16/22  0345   WBC 12.27*   RBC 4.33*   HGB 13.2*   HCT 39.5*      MCV 91.2   MCH 30.5   MCHC 33.4     BMP:   Recent Labs   Lab 04/15/22  1407   GLU 96      K 4.1      CO2 28   BUN 7   CREATININE 0.75   CALCIUM 9.0       Significant Diagnostics:  I have reviewed all pertinent imaging results/findings within the past 24 hours.

## 2022-04-16 NOTE — ANESTHESIA POSTPROCEDURE EVALUATION
Anesthesia Post Evaluation    Patient: Best Arguelles    Procedure(s) Performed: Procedure(s) (LRB):  APPENDECTOMY, LAPAROSCOPIC (N/A)    Final Anesthesia Type: general      Patient location during evaluation: PACU  Post-procedure vital signs: reviewed and stable  Pain management: adequate  Airway patency: patent  DARRELL mitigation strategies: Verification of full reversal of neuromuscular block  PONV status at discharge: No PONV  Anesthetic complications: no      Cardiovascular status: hemodynamically stable  Respiratory status: unassisted  Hydration status: euvolemic  Follow-up not needed.          Vitals Value Taken Time   /53 04/16/22 0500   Temp 36 °C (96.8 °F) 04/16/22 0500   Pulse 63 04/16/22 0500   Resp 18 04/16/22 0913   SpO2 96 % 04/16/22 0500         Event Time   Out of Recovery 18:30:00         Pain/Washington Score: Pain Rating Prior to Med Admin: 6 (4/16/2022  9:13 AM)  Washington Score: 10 (4/15/2022  6:30 PM)

## 2022-04-17 VITALS
SYSTOLIC BLOOD PRESSURE: 117 MMHG | BODY MASS INDEX: 30.06 KG/M2 | WEIGHT: 210 LBS | DIASTOLIC BLOOD PRESSURE: 70 MMHG | HEIGHT: 70 IN | RESPIRATION RATE: 20 BRPM | TEMPERATURE: 99 F | HEART RATE: 61 BPM | OXYGEN SATURATION: 100 %

## 2022-04-17 LAB — MICROORGANISM SPEC CULT: NORMAL

## 2022-04-17 PROCEDURE — 63600175 PHARM REV CODE 636 W HCPCS: Performed by: ANESTHESIOLOGY

## 2022-04-17 PROCEDURE — 25000003 PHARM REV CODE 250: Performed by: SURGERY

## 2022-04-17 RX ORDER — HYDROCODONE BITARTRATE AND ACETAMINOPHEN 7.5; 325 MG/1; MG/1
1 TABLET ORAL EVERY 6 HOURS PRN
Qty: 24 TABLET | Refills: 0 | Status: SHIPPED | OUTPATIENT
Start: 2022-04-17

## 2022-04-17 RX ADMIN — HYDROCODONE BITARTRATE AND ACETAMINOPHEN 1 TABLET: 7.5; 325 TABLET ORAL at 01:04

## 2022-04-17 RX ADMIN — MORPHINE SULFATE 4 MG: 4 INJECTION INTRAVENOUS at 03:04

## 2022-04-17 RX ADMIN — HYDROCODONE BITARTRATE AND ACETAMINOPHEN 1 TABLET: 7.5; 325 TABLET ORAL at 08:04

## 2022-04-17 NOTE — DISCHARGE SUMMARY
[facility] - Orthopedic  General Surgery  Discharge Summary      Patient Name: Best Arguelles  MRN: [id number]  Admission Date: 4/15/2022  Hospital Length of Stay: 0 days  Discharge Date and Time: No discharge date for patient encounter.  Attending Physician: No att. providers found   Discharging Provider: Leandro Clarke MD  Primary Care Provider: Primary Doctor No    HPI:    26-year-old male patient who presents with a history of abdominal pain that was onset last night around midnight.  Patient states the pain is in the right lower abdomen, and has essentially been there from the start.  He has had nausea without emesis.  Patient denies constipation or diarrhea.  The pain has been very persistent and he presented to the emergency department for further evaluation.  Patient had a white count of 13K and a CT was performed revealing a dilated fluid-filled appendix with mild amount of inflammatory change surrounding it.  There was also noted to be a mild amount of fluid in the pre rectal space.      Procedure(s) (LRB):  APPENDECTOMY, LAPAROSCOPIC (N/A)      Indwelling Lines/Drains at time of discharge:   Lines/Drains/Airways     Drain  Duration                NG/OG Tube 04/15/22 1647 [place] sump 18 Fr. Center mouth 1 day              Hospital Course: Admitted to hospital and taken urgently for lap appy.  The appendix was dilated and minimally inflamed.  Postoperatively, the patient had incisional pain, slightly more than would be expected.  The next day he felt better, was tolerating regular diet and passing flatus.  He wished to go home at this time.  He will be discharged with Norco 7.5, dispense 24, no refills  Patient will follow-up with me in 10 days.  He should not lift more than 10 lb prior to return to clinic.  Patient will notify us if he has any worsening pain or other problems after discharge      Goals of Care Treatment Preferences:  Code Status: Full Code      Consults:   Consults (From admission, onward)         Status Ordering Provider     Inpatient consult to General surgery  Once        Provider:  Leandro Clarke MD    Acknowledged HIPOLITO DEAN          Significant Diagnostic Studies: Labs:   BMP:   Recent Labs   Lab 04/15/22  1407   GLU 96      K 4.1      CO2 28   BUN 7   CREATININE 0.75   CALCIUM 9.0   , CMP   Recent Labs   Lab 04/15/22  1407      K 4.1      CO2 28   GLU 96   BUN 7   CREATININE 0.75   CALCIUM 9.0   PROT 7.5   ALBUMIN 3.9   BILITOT 0.3   ALKPHOS 76   AST 22   ALT 41   ANIONGAP 15   EGFRNONAA 133    and CBC   Recent Labs   Lab 04/15/22  1407 04/16/22  0345   WBC 13.19* 12.27*   HGB 14.0 13.2*   HCT 40.2 39.5*    306       Pending Diagnostic Studies:     Procedure Component Value Units Date/Time    EXTRA TUBES [[id number]] Collected: 04/16/22 0345    Order Status: Sent Lab Status: In process Updated: 04/16/22 0529    Specimen: Blood, Venous     Narrative:      The following orders were created for panel order EXTRA TUBES.  Procedure                               Abnormality         Status                     ---------                               -----------         ------                     Light Green Top Hold[[id number]]                             In process                   Please view results for these tests on the individual orders.    EXTRA TUBES [[id number]] Collected: 04/15/22 1407    Order Status: Sent Lab Status: In process Updated: 04/15/22 1409    Specimen: Blood, Venous     Narrative:      The following orders were created for panel order EXTRA TUBES.  Procedure                               Abnormality         Status                     ---------                               -----------         ------                     Light Blue Top Hold[[id number]]                              In process                 Light Green Top Hold[[id number]]                             In process                   Please view results for these tests on the  individual orders.    Surgical Pathology [[id number]] Collected: 04/15/22 1731    Order Status: Sent Lab Status: No result     Specimen: Tissue from Appendix         Final Active Diagnoses:    Diagnosis Date Noted POA    Acute appendicitis with localized peritonitis, without perforation or abscess [K35.30] 04/15/2022 Yes      Problems Resolved During this Admission:      Discharged Condition: good    Disposition: Home or Self Care    Follow Up:   Follow-up Information     Leandro Clarke MD Follow up.    Specialties: General Surgery, Surgery  Contact information:  [address] [place] [id number]  [phone]                       Patient Instructions:      Diet Adult Regular     Lifting restrictions   Order Comments: 10 pounds until clinic followup     Medications:  Reconciled Home Medications:      Medication List      START taking these medications    HYDROcodone-acetaminophen 7.5-325 mg per tablet  Commonly known as: NORCO  Take 1 tablet by mouth every 6 (six) hours as needed for Pain.          Time spent on the discharge of patient: 20 minutes    Leandro Clarke MD  General Surgery  [facility] - Orthopedic

## 2022-04-17 NOTE — HOSPITAL COURSE
Admitted to hospital and taken urgently for lap appy.  The appendix was dilated and minimally inflamed.  Postoperatively, the patient had incisional pain, slightly more than would be expected.  The next day he felt better, was tolerating regular diet and passing flatus.  He wished to go home at this time.  He will be discharged with Norco 7.5, dispense 24, no refills  Patient will follow-up with me in 10 days.  He should not lift more than 10 lb prior to return to clinic.  Patient will notify us if he has any worsening pain or other problems after discharge

## 2022-04-19 LAB
BACTERIA SPEC ANAEROBE CULT: NORMAL
ESTROGEN SERPL-MCNC: NORMAL PG/ML
INSULIN SERPL-ACNC: NORMAL U[IU]/ML
LAB AP GROSS DESCRIPTION: NORMAL
LAB AP LABORATORY NOTES: NORMAL
T3RU NFR SERPL: NORMAL %

## 2024-05-13 ENCOUNTER — LAB VISIT (OUTPATIENT)
Dept: PRIMARY CARE CLINIC | Facility: CLINIC | Age: 29
End: 2024-05-13

## 2024-05-13 DIAGNOSIS — Z02.83 ENCOUNTER FOR DRUG SCREENING: Primary | ICD-10-CM

## 2024-05-13 PROCEDURE — 99000 SPECIMEN HANDLING OFFICE-LAB: CPT | Mod: ,,, | Performed by: NURSE PRACTITIONER

## 2024-05-13 NOTE — PROGRESS NOTES
Subjective     Patient ID: Best Arguelles is a 28 y.o. male.    Chief Complaint: No chief complaint on file.    HPI  Review of Systems       Objective     Physical Exam       Assessment and Plan     1. Encounter for drug screening        Drug testing only           No follow-ups on file.

## 2025-01-12 ENCOUNTER — HOSPITAL ENCOUNTER (INPATIENT)
Facility: HOSPITAL | Age: 30
LOS: 6 days | Discharge: HOME | DRG: 963 | End: 2025-01-18
Attending: FAMILY MEDICINE | Admitting: SURGERY
Payer: COMMERCIAL

## 2025-01-12 DIAGNOSIS — R07.9 CHEST PAIN, UNSPECIFIED TYPE: ICD-10-CM

## 2025-01-12 DIAGNOSIS — R00.0 TACHYCARDIA: ICD-10-CM

## 2025-01-12 DIAGNOSIS — S36.039A LACERATION OF SPLEEN, INITIAL ENCOUNTER: ICD-10-CM

## 2025-01-12 DIAGNOSIS — S36.029A SPLEEN HEMATOMA, INITIAL ENCOUNTER: ICD-10-CM

## 2025-01-12 DIAGNOSIS — R07.9 CHEST PAIN: ICD-10-CM
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
## 2025-01-12 DIAGNOSIS — J18.9 PNEUMONIA OF LEFT LOWER LOBE DUE TO INFECTIOUS ORGANISM: ICD-10-CM

## 2025-01-12 DIAGNOSIS — S22.42XA CLOSED FRACTURE OF MULTIPLE RIBS OF LEFT SIDE, INITIAL ENCOUNTER: ICD-10-CM

## 2025-01-12 DIAGNOSIS — S36.029D SPLEEN HEMATOMA, SUBSEQUENT ENCOUNTER: ICD-10-CM

## 2025-01-12 DIAGNOSIS — S42.025A CLOSED NONDISPLACED FRACTURE OF SHAFT OF LEFT CLAVICLE, INITIAL ENCOUNTER: ICD-10-CM

## 2025-01-12 DIAGNOSIS — S36.029A: ICD-10-CM

## 2025-01-12 DIAGNOSIS — K66.1 HEMOPERITONEUM: ICD-10-CM
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
## 2025-01-12 DIAGNOSIS — S27.321A CONTUSION OF LEFT LUNG, INITIAL ENCOUNTER: ICD-10-CM

## 2025-01-12 DIAGNOSIS — T14.90XA TRAUMA: ICD-10-CM

## 2025-01-12 DIAGNOSIS — S89.91XA ACUTE INJURY OF ANTERIOR CRUCIATE LIGAMENT OF RIGHT KNEE, INITIAL ENCOUNTER: ICD-10-CM

## 2025-01-12 LAB
ALBUMIN SERPL BCP-MCNC: 3.7 G/DL (ref 3.5–5)
ALBUMIN/GLOB SERPL: 1.2 {RATIO}
ALP SERPL-CCNC: 55 U/L (ref 40–150)
ALT SERPL W P-5'-P-CCNC: 118 U/L
AMPHET UR QL SCN: NEGATIVE
AMYLASE SERPL-CCNC: 52 U/L (ref 25–125)
ANION GAP SERPL CALCULATED.3IONS-SCNC: 12 MMOL/L (ref 7–16)
AST SERPL W P-5'-P-CCNC: 96 U/L (ref 5–34)
BACTERIA #/AREA URNS HPF: ABNORMAL /HPF
BARBITURATES UR QL SCN: NEGATIVE
BASOPHILS # BLD AUTO: 0.03 K/UL (ref 0–0.2)
BASOPHILS NFR BLD AUTO: 0.2 % (ref 0–1)
BENZODIAZ METAB UR QL SCN: NEGATIVE
BILIRUB SERPL-MCNC: 0.3 MG/DL
BILIRUB UR QL STRIP: NEGATIVE
BUN SERPL-MCNC: 14 MG/DL (ref 9–21)
BUN/CREAT SERPL: 15 (ref 6–20)
CALCIUM SERPL-MCNC: 8.3 MG/DL (ref 8.4–10.2)
CANNABINOIDS UR QL SCN: NEGATIVE
CHLORIDE SERPL-SCNC: 107 MMOL/L (ref 98–107)
CLARITY UR: CLEAR
CO2 SERPL-SCNC: 24 MMOL/L (ref 22–29)
COCAINE UR QL SCN: NEGATIVE
COLOR UR: ABNORMAL
CREAT SERPL-MCNC: 0.93 MG/DL (ref 0.72–1.25)
DIFFERENTIAL METHOD BLD: ABNORMAL
EGFR (NO RACE VARIABLE) (RUSH/TITUS): 113 ML/MIN/1.73M2
EOSINOPHIL # BLD AUTO: 0.07 K/UL (ref 0–0.5)
EOSINOPHIL NFR BLD AUTO: 0.5 % (ref 1–4)
ERYTHROCYTE [DISTWIDTH] IN BLOOD BY AUTOMATED COUNT: 12.6 % (ref 11.5–14.5)
ETHANOL SERPL-MCNC: <10 MG/DL
GLOBULIN SER-MCNC: 3 G/DL (ref 2–4)
GLUCOSE SERPL-MCNC: 140 MG/DL (ref 74–100)
GLUCOSE UR STRIP-MCNC: 30 MG/DL
HCT VFR BLD AUTO: 39.7 % (ref 40–54)
HGB BLD-MCNC: 13.4 G/DL (ref 13.5–18)
IMM GRANULOCYTES # BLD AUTO: 0.15 K/UL (ref 0–0.04)
IMM GRANULOCYTES NFR BLD: 1 % (ref 0–0.4)
KETONES UR STRIP-SCNC: NEGATIVE MG/DL
LEUKOCYTE ESTERASE UR QL STRIP: NEGATIVE
LIPASE SERPL-CCNC: 150 U/L
LYMPHOCYTES # BLD AUTO: 1.79 K/UL (ref 1–4.8)
LYMPHOCYTES NFR BLD AUTO: 12.5 % (ref 27–41)
MCH RBC QN AUTO: 30.8 PG (ref 27–31)
MCHC RBC AUTO-ENTMCNC: 33.8 G/DL (ref 32–36)
MCV RBC AUTO: 91.3 FL (ref 80–96)
MONOCYTES # BLD AUTO: 0.97 K/UL (ref 0–0.8)
MONOCYTES NFR BLD AUTO: 6.8 % (ref 2–6)
MPC BLD CALC-MCNC: 8.7 FL (ref 9.4–12.4)
MUCOUS, UA: ABNORMAL /LPF
NEUTROPHILS # BLD AUTO: 11.34 K/UL (ref 1.8–7.7)
NEUTROPHILS NFR BLD AUTO: 79 % (ref 53–65)
NITRITE UR QL STRIP: NEGATIVE
NRBC # BLD AUTO: 0 X10E3/UL
NRBC, AUTO (.00): 0 %
OPIATES UR QL SCN: POSITIVE
PCP UR QL SCN: NEGATIVE
PH UR STRIP: 6.5 PH UNITS
PLATELET # BLD AUTO: 250 K/UL (ref 150–400)
POTASSIUM SERPL-SCNC: 3.9 MMOL/L (ref 3.5–5.1)
PROT SERPL-MCNC: 6.7 G/DL (ref 6.4–8.3)
PROT UR QL STRIP: 100
RBC # BLD AUTO: 4.35 M/UL (ref 4.6–6.2)
RBC # UR STRIP: ABNORMAL /UL
RBC #/AREA URNS HPF: 15 /HPF
SODIUM SERPL-SCNC: 139 MMOL/L (ref 136–145)
SP GR UR STRIP: >1.05
SQUAMOUS #/AREA URNS LPF: ABNORMAL /HPF
UROBILINOGEN UR STRIP-ACNC: NORMAL MG/DL
WBC # BLD AUTO: 14.35 K/UL (ref 4.5–11)
WBC #/AREA URNS HPF: 12 /HPF

## 2025-01-12 PROCEDURE — 96374 THER/PROPH/DIAG INJ IV PUSH: CPT

## 2025-01-12 PROCEDURE — 25000003 PHARM REV CODE 250

## 2025-01-12 PROCEDURE — 96376 TX/PRO/DX INJ SAME DRUG ADON: CPT

## 2025-01-12 PROCEDURE — 25500020 PHARM REV CODE 255: Performed by: FAMILY MEDICINE

## 2025-01-12 PROCEDURE — 63600175 PHARM REV CODE 636 W HCPCS: Performed by: EMERGENCY MEDICINE

## 2025-01-12 PROCEDURE — 85025 COMPLETE CBC W/AUTO DIFF WBC: CPT | Performed by: FAMILY MEDICINE

## 2025-01-12 PROCEDURE — 83690 ASSAY OF LIPASE: CPT | Performed by: FAMILY MEDICINE

## 2025-01-12 PROCEDURE — 25000003 PHARM REV CODE 250: Performed by: EMERGENCY MEDICINE

## 2025-01-12 PROCEDURE — 82077 ASSAY SPEC XCP UR&BREATH IA: CPT | Performed by: FAMILY MEDICINE

## 2025-01-12 PROCEDURE — G0390 TRAUMA RESPONS W/HOSP CRITI: HCPCS

## 2025-01-12 PROCEDURE — 87086 URINE CULTURE/COLONY COUNT: CPT | Performed by: FAMILY MEDICINE

## 2025-01-12 PROCEDURE — 36415 COLL VENOUS BLD VENIPUNCTURE: CPT | Performed by: FAMILY MEDICINE

## 2025-01-12 PROCEDURE — 80307 DRUG TEST PRSMV CHEM ANLYZR: CPT | Performed by: FAMILY MEDICINE

## 2025-01-12 PROCEDURE — 63600175 PHARM REV CODE 636 W HCPCS: Performed by: FAMILY MEDICINE

## 2025-01-12 PROCEDURE — 96375 TX/PRO/DX INJ NEW DRUG ADDON: CPT

## 2025-01-12 PROCEDURE — 82150 ASSAY OF AMYLASE: CPT | Performed by: FAMILY MEDICINE

## 2025-01-12 PROCEDURE — 63600175 PHARM REV CODE 636 W HCPCS: Performed by: SURGERY

## 2025-01-12 PROCEDURE — 81003 URINALYSIS AUTO W/O SCOPE: CPT | Mod: 59 | Performed by: FAMILY MEDICINE

## 2025-01-12 PROCEDURE — 80053 COMPREHEN METABOLIC PANEL: CPT | Performed by: FAMILY MEDICINE

## 2025-01-12 PROCEDURE — 11000001 HC ACUTE MED/SURG PRIVATE ROOM

## 2025-01-12 RX ORDER — IOPAMIDOL 612 MG/ML
100 INJECTION, SOLUTION INTRAVASCULAR
Status: COMPLETED | OUTPATIENT
Start: 2025-01-12 | End: 2025-01-12

## 2025-01-12 RX ORDER — MORPHINE SULFATE 4 MG/ML
4 INJECTION, SOLUTION INTRAMUSCULAR; INTRAVENOUS
Status: COMPLETED | OUTPATIENT
Start: 2025-01-12 | End: 2025-01-12

## 2025-01-12 RX ORDER — MORPHINE SULFATE 4 MG/ML
4 INJECTION, SOLUTION INTRAMUSCULAR; INTRAVENOUS EVERY 4 HOURS PRN
Status: DISCONTINUED | OUTPATIENT
Start: 2025-01-12 | End: 2025-01-13

## 2025-01-12 RX ORDER — TRANEXAMIC ACID 100 MG/ML
1000 INJECTION, SOLUTION INTRAVENOUS ONCE
Status: COMPLETED | OUTPATIENT
Start: 2025-01-12 | End: 2025-01-12

## 2025-01-12 RX ORDER — TALC
6 POWDER (GRAM) TOPICAL NIGHTLY PRN
Status: DISCONTINUED | OUTPATIENT
Start: 2025-01-12 | End: 2025-01-18 | Stop reason: HOSPADM

## 2025-01-12 RX ORDER — ACETAMINOPHEN 325 MG/1
650 TABLET ORAL EVERY 8 HOURS PRN
Status: DISCONTINUED | OUTPATIENT
Start: 2025-01-12 | End: 2025-01-18 | Stop reason: HOSPADM

## 2025-01-12 RX ORDER — SODIUM CHLORIDE 9 MG/ML
INJECTION, SOLUTION INTRAVENOUS
Status: COMPLETED
Start: 2025-01-12 | End: 2025-01-12

## 2025-01-12 RX ORDER — MORPHINE SULFATE 2 MG/ML
2 INJECTION, SOLUTION INTRAMUSCULAR; INTRAVENOUS EVERY 4 HOURS PRN
Status: DISCONTINUED | OUTPATIENT
Start: 2025-01-12 | End: 2025-01-13

## 2025-01-12 RX ORDER — ONDANSETRON HYDROCHLORIDE 2 MG/ML
4 INJECTION, SOLUTION INTRAVENOUS
Status: COMPLETED | OUTPATIENT
Start: 2025-01-12 | End: 2025-01-12

## 2025-01-12 RX ORDER — DEXTROSE MONOHYDRATE, SODIUM CHLORIDE, AND POTASSIUM CHLORIDE 50; 1.49; 4.5 G/1000ML; G/1000ML; G/1000ML
INJECTION, SOLUTION INTRAVENOUS CONTINUOUS
Status: DISCONTINUED | OUTPATIENT
Start: 2025-01-12 | End: 2025-01-15

## 2025-01-12 RX ORDER — ONDANSETRON 4 MG/1
8 TABLET, ORALLY DISINTEGRATING ORAL EVERY 8 HOURS PRN
Status: DISCONTINUED | OUTPATIENT
Start: 2025-01-12 | End: 2025-01-18 | Stop reason: HOSPADM

## 2025-01-12 RX ADMIN — TRANEXAMIC ACID 1000 MG: 100 INJECTION INTRAVENOUS at 07:01

## 2025-01-12 RX ADMIN — POTASSIUM CHLORIDE, DEXTROSE MONOHYDRATE AND SODIUM CHLORIDE: 150; 5; 450 INJECTION, SOLUTION INTRAVENOUS at 09:01

## 2025-01-12 RX ADMIN — MORPHINE SULFATE 4 MG: 4 INJECTION, SOLUTION INTRAMUSCULAR; INTRAVENOUS at 09:01

## 2025-01-12 RX ADMIN — MORPHINE SULFATE 4 MG: 4 INJECTION, SOLUTION INTRAMUSCULAR; INTRAVENOUS at 05:01

## 2025-01-12 RX ADMIN — ONDANSETRON 4 MG: 2 INJECTION INTRAMUSCULAR; INTRAVENOUS at 08:01

## 2025-01-12 RX ADMIN — IOPAMIDOL 100 ML: 612 INJECTION, SOLUTION INTRAVENOUS at 05:01

## 2025-01-12 RX ADMIN — ONDANSETRON 4 MG: 2 INJECTION INTRAMUSCULAR; INTRAVENOUS at 05:01

## 2025-01-12 RX ADMIN — SODIUM CHLORIDE 100 ML: 9 INJECTION, SOLUTION INTRAVENOUS at 08:01
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
## 2025-01-13 PROBLEM — S27.321A LEFT PULMONARY CONTUSION: Status: ACTIVE | Noted: 2025-01-13

## 2025-01-13 PROBLEM — S42.025A CLOSED NONDISPLACED FRACTURE OF SHAFT OF LEFT CLAVICLE: Status: ACTIVE | Noted: 2025-01-13

## 2025-01-13 PROBLEM — S36.029A INJURY OF SPLEEN WITH HEMATOMA: Status: ACTIVE | Noted: 2025-01-13

## 2025-01-13 LAB
BASOPHILS # BLD AUTO: 0.02 K/UL (ref 0–0.2)
BASOPHILS # BLD AUTO: 0.03 K/UL (ref 0–0.2)
BASOPHILS # BLD AUTO: 0.04 K/UL (ref 0–0.2)
BASOPHILS NFR BLD AUTO: 0.1 % (ref 0–1)
BASOPHILS NFR BLD AUTO: 0.2 % (ref 0–1)
BASOPHILS NFR BLD AUTO: 0.2 % (ref 0–1)
DIFFERENTIAL METHOD BLD: ABNORMAL
EOSINOPHIL # BLD AUTO: 0 K/UL (ref 0–0.5)
EOSINOPHIL NFR BLD AUTO: 0 % (ref 1–4)
ERYTHROCYTE [DISTWIDTH] IN BLOOD BY AUTOMATED COUNT: 12.8 % (ref 11.5–14.5)
ERYTHROCYTE [DISTWIDTH] IN BLOOD BY AUTOMATED COUNT: 12.8 % (ref 11.5–14.5)
ERYTHROCYTE [DISTWIDTH] IN BLOOD BY AUTOMATED COUNT: 13.1 % (ref 11.5–14.5)
HCT VFR BLD AUTO: 34.2 % (ref 40–54)
HCT VFR BLD AUTO: 37.9 % (ref 40–54)
HCT VFR BLD AUTO: 38.2 % (ref 40–54)
HGB BLD-MCNC: 11.7 G/DL (ref 13.5–18)
HGB BLD-MCNC: 12.8 G/DL (ref 13.5–18)
HGB BLD-MCNC: 12.9 G/DL (ref 13.5–18)
IMM GRANULOCYTES # BLD AUTO: 0.05 K/UL (ref 0–0.04)
IMM GRANULOCYTES # BLD AUTO: 0.09 K/UL (ref 0–0.04)
IMM GRANULOCYTES # BLD AUTO: 0.12 K/UL (ref 0–0.04)
IMM GRANULOCYTES NFR BLD: 0.3 % (ref 0–0.4)
IMM GRANULOCYTES NFR BLD: 0.5 % (ref 0–0.4)
IMM GRANULOCYTES NFR BLD: 0.5 % (ref 0–0.4)
LIPASE SERPL-CCNC: 49 U/L
LYMPHOCYTES # BLD AUTO: 0.76 K/UL (ref 1–4.8)
LYMPHOCYTES # BLD AUTO: 1.31 K/UL (ref 1–4.8)
LYMPHOCYTES # BLD AUTO: 1.49 K/UL (ref 1–4.8)
LYMPHOCYTES NFR BLD AUTO: 10.4 % (ref 27–41)
LYMPHOCYTES NFR BLD AUTO: 3.5 % (ref 27–41)
LYMPHOCYTES NFR BLD AUTO: 7.4 % (ref 27–41)
MCH RBC QN AUTO: 30.4 PG (ref 27–31)
MCH RBC QN AUTO: 31.1 PG (ref 27–31)
MCH RBC QN AUTO: 31.5 PG (ref 27–31)
MCHC RBC AUTO-ENTMCNC: 33.5 G/DL (ref 32–36)
MCHC RBC AUTO-ENTMCNC: 34 G/DL (ref 32–36)
MCHC RBC AUTO-ENTMCNC: 34.2 G/DL (ref 32–36)
MCV RBC AUTO: 90.7 FL (ref 80–96)
MCV RBC AUTO: 91.3 FL (ref 80–96)
MCV RBC AUTO: 91.9 FL (ref 80–96)
MONOCYTES # BLD AUTO: 1.47 K/UL (ref 0–0.8)
MONOCYTES # BLD AUTO: 2.14 K/UL (ref 0–0.8)
MONOCYTES # BLD AUTO: 2.42 K/UL (ref 0–0.8)
MONOCYTES NFR BLD AUTO: 12.2 % (ref 2–6)
MONOCYTES NFR BLD AUTO: 16.9 % (ref 2–6)
MONOCYTES NFR BLD AUTO: 6.7 % (ref 2–6)
MPC BLD CALC-MCNC: 8.8 FL (ref 9.4–12.4)
MPC BLD CALC-MCNC: 8.9 FL (ref 9.4–12.4)
MPC BLD CALC-MCNC: 9 FL (ref 9.4–12.4)
NEUTROPHILS # BLD AUTO: 10.38 K/UL (ref 1.8–7.7)
NEUTROPHILS # BLD AUTO: 14.03 K/UL (ref 1.8–7.7)
NEUTROPHILS # BLD AUTO: 19.44 K/UL (ref 1.8–7.7)
NEUTROPHILS NFR BLD AUTO: 72.3 % (ref 53–65)
NEUTROPHILS NFR BLD AUTO: 79.7 % (ref 53–65)
NEUTROPHILS NFR BLD AUTO: 89.1 % (ref 53–65)
NRBC # BLD AUTO: 0 X10E3/UL
NRBC, AUTO (.00): 0 %
PLATELET # BLD AUTO: 213 K/UL (ref 150–400)
PLATELET # BLD AUTO: 254 K/UL (ref 150–400)
PLATELET # BLD AUTO: 258 K/UL (ref 150–400)
RBC # BLD AUTO: 3.72 M/UL (ref 4.6–6.2)
RBC # BLD AUTO: 4.15 M/UL (ref 4.6–6.2)
RBC # BLD AUTO: 4.21 M/UL (ref 4.6–6.2)
WBC # BLD AUTO: 14.36 K/UL (ref 4.5–11)
WBC # BLD AUTO: 17.6 K/UL (ref 4.5–11)
WBC # BLD AUTO: 21.83 K/UL (ref 4.5–11)

## 2025-01-13 PROCEDURE — 25000003 PHARM REV CODE 250: Performed by: EMERGENCY MEDICINE

## 2025-01-13 PROCEDURE — 63600175 PHARM REV CODE 636 W HCPCS: Performed by: SURGERY

## 2025-01-13 PROCEDURE — 99292 CRITICAL CARE ADDL 30 MIN: CPT

## 2025-01-13 PROCEDURE — 11000001 HC ACUTE MED/SURG PRIVATE ROOM

## 2025-01-13 PROCEDURE — 85025 COMPLETE CBC W/AUTO DIFF WBC: CPT | Performed by: SURGERY

## 2025-01-13 PROCEDURE — 25000003 PHARM REV CODE 250: Performed by: SURGERY

## 2025-01-13 PROCEDURE — 83690 ASSAY OF LIPASE: CPT | Performed by: EMERGENCY MEDICINE

## 2025-01-13 PROCEDURE — 99900035 HC TECH TIME PER 15 MIN (STAT)

## 2025-01-13 PROCEDURE — 94799 UNLISTED PULMONARY SVC/PX: CPT

## 2025-01-13 PROCEDURE — 36415 COLL VENOUS BLD VENIPUNCTURE: CPT | Performed by: SURGERY

## 2025-01-13 PROCEDURE — 99285 EMERGENCY DEPT VISIT HI MDM: CPT | Mod: 25

## 2025-01-13 PROCEDURE — 51702 INSERT TEMP BLADDER CATH: CPT

## 2025-01-13 RX ORDER — HYDROMORPHONE HYDROCHLORIDE 2 MG/ML
1 INJECTION, SOLUTION INTRAMUSCULAR; INTRAVENOUS; SUBCUTANEOUS
Status: DISCONTINUED | OUTPATIENT
Start: 2025-01-13 | End: 2025-01-17

## 2025-01-13 RX ORDER — MUPIROCIN 20 MG/G
OINTMENT TOPICAL 2 TIMES DAILY
Status: DISCONTINUED | OUTPATIENT
Start: 2025-01-14 | End: 2025-01-18 | Stop reason: HOSPADM

## 2025-01-13 RX ADMIN — MORPHINE SULFATE 4 MG: 4 INJECTION, SOLUTION INTRAMUSCULAR; INTRAVENOUS at 04:01

## 2025-01-13 RX ADMIN — ONDANSETRON 8 MG: 4 TABLET, ORALLY DISINTEGRATING ORAL at 08:01

## 2025-01-13 RX ADMIN — POTASSIUM CHLORIDE, DEXTROSE MONOHYDRATE AND SODIUM CHLORIDE: 150; 5; 450 INJECTION, SOLUTION INTRAVENOUS at 02:01

## 2025-01-13 RX ADMIN — MORPHINE SULFATE 4 MG: 4 INJECTION, SOLUTION INTRAMUSCULAR; INTRAVENOUS at 12:01

## 2025-01-13 RX ADMIN — MORPHINE SULFATE 4 MG: 4 INJECTION, SOLUTION INTRAMUSCULAR; INTRAVENOUS at 05:01

## 2025-01-13 RX ADMIN — POTASSIUM CHLORIDE, DEXTROSE MONOHYDRATE AND SODIUM CHLORIDE: 150; 5; 450 INJECTION, SOLUTION INTRAVENOUS at 10:01

## 2025-01-13 RX ADMIN — ONDANSETRON 8 MG: 4 TABLET, ORALLY DISINTEGRATING ORAL at 04:01

## 2025-01-13 RX ADMIN — POTASSIUM CHLORIDE, DEXTROSE MONOHYDRATE AND SODIUM CHLORIDE: 150; 5; 450 INJECTION, SOLUTION INTRAVENOUS at 06:01

## 2025-01-13 RX ADMIN — Medication 6 MG: at 02:01

## 2025-01-13 RX ADMIN — MORPHINE SULFATE 4 MG: 4 INJECTION, SOLUTION INTRAMUSCULAR; INTRAVENOUS at 01:01

## 2025-01-13 RX ADMIN — MORPHINE SULFATE 4 MG: 4 INJECTION, SOLUTION INTRAMUSCULAR; INTRAVENOUS at 08:01

## 2025-01-13 RX ADMIN — SODIUM CHLORIDE 1000 ML: 9 INJECTION, SOLUTION INTRAVENOUS at 06:01

## 2025-01-13 RX ADMIN — ONDANSETRON 8 MG: 4 TABLET, ORALLY DISINTEGRATING ORAL at 01:01

## 2025-01-13 NOTE — SUBJECTIVE & OBJECTIVE
No current facility-administered medications on file prior to encounter.     No current outpatient medications on file prior to encounter.       Review of patient's allergies indicates:  No Known Allergies    History reviewed. No pertinent past medical history.  Past Surgical History:   Procedure Laterality Date    LAPAROSCOPIC APPENDECTOMY N/A 04/15/2022    Procedure: APPENDECTOMY, LAPAROSCOPIC;  Surgeon: Leandro Clarke MD;  Location: [facility] [place];  Service: General;  Laterality: N/A;     Family History    None       Tobacco Use    Smoking status: Never    Smokeless tobacco: Current     Types: Snuff    Tobacco comments:     occasionally   Substance and Sexual Activity    Alcohol use: Not Currently     Comment: drinks on special occasions    Drug use: Never    Sexual activity: Not Currently     Review of Systems   Constitutional:  Positive for activity change and fatigue.   Musculoskeletal:  Positive for arthralgias (left shoulder, right hip and upper thigh.), myalgias and neck pain.   All other systems reviewed and are negative.    Objective:     Vital Signs (Most Recent):  Temp: 98.1 °F (36.7 °C) (01/13/25 0129)  Pulse: (!) 122 (01/13/25 1110)  Resp: 16 (01/13/25 1110)  BP: 131/76 (01/13/25 1110)  SpO2: 96 % (01/13/25 1110) Vital Signs (24h Range):  Temp:  [97.7 °F (36.5 °C)-98.1 °F (36.7 °C)] 98.1 °F (36.7 °C)  Pulse:  [] 122  Resp:  [13-29] 16  SpO2:  [93 %-99 %] 96 %  BP: ()/(55-91) 131/76     Weight: 113.4 kg (250 lb)  Body mass index is 35.87 kg/m².     Physical Exam  Vitals and nursing note reviewed.   Constitutional:       Appearance: Normal appearance.   HENT:      Head: Normocephalic.      Nose: Nose normal.      Mouth/Throat:      Mouth: Mucous membranes are moist.   Eyes:      Extraocular Movements: Extraocular movements intact.   Cardiovascular:      Rate and Rhythm: Normal rate.   Pulmonary:      Effort: Pulmonary effort is normal.   Abdominal:      Tenderness: There is abdominal tenderness  "(generalized).   Musculoskeletal:         General: Tenderness and signs of injury present. Normal range of motion.      Cervical back: Normal range of motion.   Skin:     General: Skin is warm and dry.      Capillary Refill: Capillary refill takes less than 2 seconds.      Findings: Signs of injury present.          Neurological:      General: No focal deficit present.      Mental Status: He is alert and oriented to person, place, and time.   Psychiatric:         Mood and Affect: Mood normal.         Behavior: Behavior is cooperative.         Thought Content: Thought content normal.            I have reviewed all pertinent lab results within the past 24 hours.  CBC:   Recent Labs   Lab 01/13/25  0511   WBC 17.60*   RBC 4.15*   HGB 12.9*   HCT 37.9*      MCV 91.3   MCH 31.1*   MCHC 34.0     CMP:   Recent Labs   Lab 01/12/25  1655   *   CALCIUM 8.3*   ALBUMIN 3.7   PROT 6.7      K 3.9   CO2 24      BUN 14   CREATININE 0.93   ALKPHOS 55   *   AST 96*   BILITOT 0.3     Coagulation: No results for input(s): "LABPROT", "INR", "APTT" in the last 168 hours.  Recent Labs   Lab 01/12/25 2005   COLORU Light Yellow   SPECGRAV >1.050*   PHUR 6.5   PROTEINUA 100*   BACTERIA Few*   NITRITE Negative   LEUKOCYTESUR Negative   UROBILINOGEN Normal       Significant Diagnostics:  I have reviewed all pertinent imaging results/findings within the past 24 hours.    "
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
## 2025-01-13 NOTE — PHARMACY MED REC
"Admission Medication History     The home medication history was taken by Thania Colbert.    You may go to "Admission" then "Reconcile Home Medications" tabs to review and/or act upon these items.     The home medication list has been updated by the Pharmacy department.   Please read ALL comments highlighted in yellow.   Please address this information as you see fit.    Feel free to contact us if you have any questions or require assistance.      Patient reports no longer taking the following medication(s). The medication(s) listed below were removed from the home medication list. Please reorder if appropriate:  Hydrocodone-acetaminophen 7.5-325 mg    Medications listed below were obtained from: [employer] and Medical records  (Not in a hospital admission)        Current Outpatient Medications on File Prior to Encounter   Medication Sig Dispense Refill Last Dose/Taking    [DISCONTINUED] HYDROcodone-acetaminophen (NORCO) 7.5-325 mg per tablet Take 1 tablet by mouth every 6 (six) hours as needed for Pain. 24 tablet 0 Unknown         Potential issues to be addressed PRIOR TO DISCHARGE  N/A.    Thania Colbert  Medication Access Specialist  [id number]    .          "

## 2025-01-14 PROBLEM — S36.029A HEMATOMA OF SPLEEN, CLOSED, INITIAL ENCOUNTER: Status: ACTIVE | Noted: 2025-01-14

## 2025-01-14 LAB
ALBUMIN SERPL BCP-MCNC: 3.1 G/DL (ref 3.5–5)
ALBUMIN/GLOB SERPL: 0.9 {RATIO}
ALP SERPL-CCNC: 44 U/L (ref 40–150)
ALT SERPL W P-5'-P-CCNC: 59 U/L
ANION GAP SERPL CALCULATED.3IONS-SCNC: 11 MMOL/L (ref 7–16)
AST SERPL W P-5'-P-CCNC: 47 U/L (ref 5–34)
BASOPHILS # BLD AUTO: 0.03 K/UL (ref 0–0.2)
BASOPHILS NFR BLD AUTO: 0.2 % (ref 0–1)
BILIRUB SERPL-MCNC: 0.9 MG/DL
BUN SERPL-MCNC: 8 MG/DL (ref 9–21)
BUN/CREAT SERPL: 9 (ref 6–20)
CALCIUM SERPL-MCNC: 8.6 MG/DL (ref 8.4–10.2)
CHLORIDE SERPL-SCNC: 103 MMOL/L (ref 98–107)
CO2 SERPL-SCNC: 24 MMOL/L (ref 22–29)
CREAT SERPL-MCNC: 0.85 MG/DL (ref 0.72–1.25)
DIFFERENTIAL METHOD BLD: ABNORMAL
EGFR (NO RACE VARIABLE) (RUSH/TITUS): 120 ML/MIN/1.73M2
EOSINOPHIL # BLD AUTO: 0 K/UL (ref 0–0.5)
EOSINOPHIL NFR BLD AUTO: 0 % (ref 1–4)
ERYTHROCYTE [DISTWIDTH] IN BLOOD BY AUTOMATED COUNT: 12.8 % (ref 11.5–14.5)
GLOBULIN SER-MCNC: 3.4 G/DL (ref 2–4)
GLUCOSE SERPL-MCNC: 122 MG/DL (ref 74–100)
HCT VFR BLD AUTO: 31.8 % (ref 40–54)
HGB BLD-MCNC: 11 G/DL (ref 13.5–18)
IMM GRANULOCYTES # BLD AUTO: 0.07 K/UL (ref 0–0.04)
IMM GRANULOCYTES NFR BLD: 0.4 % (ref 0–0.4)
LIPASE SERPL-CCNC: 86 U/L
LYMPHOCYTES # BLD AUTO: 1.41 K/UL (ref 1–4.8)
LYMPHOCYTES NFR BLD AUTO: 7.7 % (ref 27–41)
MAGNESIUM SERPL-MCNC: 1.8 MG/DL (ref 1.6–2.6)
MCH RBC QN AUTO: 31.6 PG (ref 27–31)
MCHC RBC AUTO-ENTMCNC: 34.6 G/DL (ref 32–36)
MCV RBC AUTO: 91.4 FL (ref 80–96)
MONOCYTES # BLD AUTO: 2.83 K/UL (ref 0–0.8)
MONOCYTES NFR BLD AUTO: 15.4 % (ref 2–6)
MPC BLD CALC-MCNC: 9 FL (ref 9.4–12.4)
NEUTROPHILS # BLD AUTO: 14.02 K/UL (ref 1.8–7.7)
NEUTROPHILS NFR BLD AUTO: 76.3 % (ref 53–65)
NRBC # BLD AUTO: 0 X10E3/UL
NRBC, AUTO (.00): 0 %
PLATELET # BLD AUTO: 196 K/UL (ref 150–400)
POTASSIUM SERPL-SCNC: 5.2 MMOL/L (ref 3.5–5.1)
PROT SERPL-MCNC: 6.5 G/DL (ref 6.4–8.3)
RBC # BLD AUTO: 3.48 M/UL (ref 4.6–6.2)
SODIUM SERPL-SCNC: 133 MMOL/L (ref 136–145)
TROPONIN I SERPL HS-MCNC: 14.5 NG/L
UA COMPLETE W REFLEX CULTURE PNL UR: NO GROWTH
WBC # BLD AUTO: 18.36 K/UL (ref 4.5–11)

## 2025-01-14 PROCEDURE — 99900035 HC TECH TIME PER 15 MIN (STAT)

## 2025-01-14 PROCEDURE — 83690 ASSAY OF LIPASE: CPT | Performed by: HOSPITALIST

## 2025-01-14 PROCEDURE — 27000221 HC OXYGEN, UP TO 24 HOURS

## 2025-01-14 PROCEDURE — 83735 ASSAY OF MAGNESIUM: CPT | Performed by: HOSPITALIST

## 2025-01-14 PROCEDURE — 93005 ELECTROCARDIOGRAM TRACING: CPT

## 2025-01-14 PROCEDURE — 97162 PT EVAL MOD COMPLEX 30 MIN: CPT

## 2025-01-14 PROCEDURE — 80053 COMPREHEN METABOLIC PANEL: CPT | Performed by: HOSPITALIST

## 2025-01-14 PROCEDURE — 63600175 PHARM REV CODE 636 W HCPCS: Mod: TB | Performed by: SURGERY

## 2025-01-14 PROCEDURE — 94761 N-INVAS EAR/PLS OXIMETRY MLT: CPT

## 2025-01-14 PROCEDURE — 25000003 PHARM REV CODE 250: Performed by: SURGERY

## 2025-01-14 PROCEDURE — 25500020 PHARM REV CODE 255: Performed by: SURGERY

## 2025-01-14 PROCEDURE — 36415 COLL VENOUS BLD VENIPUNCTURE: CPT | Performed by: SURGERY

## 2025-01-14 PROCEDURE — 36415 COLL VENOUS BLD VENIPUNCTURE: CPT | Performed by: HOSPITALIST

## 2025-01-14 PROCEDURE — 25000003 PHARM REV CODE 250: Performed by: HOSPITALIST

## 2025-01-14 PROCEDURE — 85025 COMPLETE CBC W/AUTO DIFF WBC: CPT | Performed by: SURGERY

## 2025-01-14 PROCEDURE — 63600175 PHARM REV CODE 636 W HCPCS: Performed by: SURGERY

## 2025-01-14 PROCEDURE — 25000003 PHARM REV CODE 250: Performed by: INTERNAL MEDICINE

## 2025-01-14 PROCEDURE — 84484 ASSAY OF TROPONIN QUANT: CPT | Performed by: HOSPITALIST

## 2025-01-14 PROCEDURE — 11000001 HC ACUTE MED/SURG PRIVATE ROOM

## 2025-01-14 RX ORDER — DIATRIZOATE MEGLUMINE AND DIATRIZOATE SODIUM 660; 100 MG/ML; MG/ML
30 SOLUTION ORAL; RECTAL
Status: COMPLETED | OUTPATIENT
Start: 2025-01-14 | End: 2025-01-14

## 2025-01-14 RX ORDER — SODIUM CHLORIDE 450 MG/100ML
INJECTION, SOLUTION INTRAVENOUS CONTINUOUS
Status: DISCONTINUED | OUTPATIENT
Start: 2025-01-14 | End: 2025-01-15

## 2025-01-14 RX ORDER — IOPAMIDOL 612 MG/ML
100 INJECTION, SOLUTION INTRAVASCULAR
Status: COMPLETED | OUTPATIENT
Start: 2025-01-14 | End: 2025-01-14

## 2025-01-14 RX ORDER — HYDROCODONE BITARTRATE AND ACETAMINOPHEN 7.5; 325 MG/1; MG/1
1 TABLET ORAL EVERY 6 HOURS PRN
Status: DISCONTINUED | OUTPATIENT
Start: 2025-01-14 | End: 2025-01-17

## 2025-01-14 RX ADMIN — SODIUM CHLORIDE: 4.5 INJECTION, SOLUTION INTRAVENOUS at 06:01

## 2025-01-14 RX ADMIN — MUPIROCIN: 20 OINTMENT TOPICAL at 09:01

## 2025-01-14 RX ADMIN — HYDROMORPHONE HYDROCHLORIDE 1 MG: 2 INJECTION, SOLUTION INTRAMUSCULAR; INTRAVENOUS; SUBCUTANEOUS at 04:01

## 2025-01-14 RX ADMIN — HYDROCODONE BITARTRATE AND ACETAMINOPHEN 1 TABLET: 7.5; 325 TABLET ORAL at 04:01

## 2025-01-14 RX ADMIN — Medication 6 MG: at 08:01

## 2025-01-14 RX ADMIN — IOPAMIDOL 100 ML: 612 INJECTION, SOLUTION INTRAVENOUS at 01:01

## 2025-01-14 RX ADMIN — DIATRIZOATE MEGLUMINE AND DIATRIZOATE SODIUM 30 ML: 600; 100 SOLUTION ORAL; RECTAL at 10:01

## 2025-01-14 RX ADMIN — HYDROMORPHONE HYDROCHLORIDE 1 MG: 2 INJECTION, SOLUTION INTRAMUSCULAR; INTRAVENOUS; SUBCUTANEOUS at 10:01

## 2025-01-14 RX ADMIN — HYDROMORPHONE HYDROCHLORIDE 1 MG: 2 INJECTION, SOLUTION INTRAMUSCULAR; INTRAVENOUS; SUBCUTANEOUS at 12:01

## 2025-01-14 RX ADMIN — POTASSIUM CHLORIDE, DEXTROSE MONOHYDRATE AND SODIUM CHLORIDE: 150; 5; 450 INJECTION, SOLUTION INTRAVENOUS at 07:01

## 2025-01-14 RX ADMIN — HYDROMORPHONE HYDROCHLORIDE 1 MG: 2 INJECTION, SOLUTION INTRAMUSCULAR; INTRAVENOUS; SUBCUTANEOUS at 07:01

## 2025-01-14 RX ADMIN — HYDROMORPHONE HYDROCHLORIDE 1 MG: 2 INJECTION, SOLUTION INTRAMUSCULAR; INTRAVENOUS; SUBCUTANEOUS at 05:01

## 2025-01-14 RX ADMIN — HYDROMORPHONE HYDROCHLORIDE 1 MG: 2 INJECTION, SOLUTION INTRAMUSCULAR; INTRAVENOUS; SUBCUTANEOUS at 01:01

## 2025-01-14 RX ADMIN — HYDROMORPHONE HYDROCHLORIDE 1 MG: 2 INJECTION, SOLUTION INTRAMUSCULAR; INTRAVENOUS; SUBCUTANEOUS at 08:01

## 2025-01-14 NOTE — ED NOTES
Nurse at pt's room and pt is complaining of pain 10/10, states it is like a cramping and then alternating with sharp shooting pain in the lower left quad that moves across the whole abdomen.  Pt heart rate in the 140-150 range.  Nurse administered morphine 4mg ivp at this time. Nurse instructed pt that she will contact MD and report the findings.  Abdomen is soft but very tender.  Pt c/o a lot of thirst also.

## 2025-01-14 NOTE — PLAN OF CARE
Problem: Adult Inpatient Plan of Care  Goal: Plan of Care Review  Outcome: Progressing  Goal: Patient-Specific Goal (Individualized)  Outcome: Progressing  Goal: Absence of Hospital-Acquired Illness or Injury  Outcome: Progressing  Goal: Optimal Comfort and Wellbeing  Outcome: Progressing     Problem: Infection  Goal: Absence of Infection Signs and Symptoms  Outcome: Progressing     Problem: Skin Injury Risk Increased  Goal: Skin Health and Integrity  Outcome: Progressing     Problem: Bariatric Environmental Safety  Goal: Safety Maintained with Care  Outcome: Progressing     Problem: Gas Exchange Impaired  Goal: Optimal Gas Exchange  Outcome: Progressing
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
## 2025-01-14 NOTE — NURSING
Patient c/o right knee pain, xray ordered and done. PT/OT eval in, will see how his mobility is. Primary team aware of this.
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
## 2025-01-14 NOTE — SUBJECTIVE & OBJECTIVE
Interval History:  No acute changes overnight.   Medications:  Continuous Infusions:   dextrose 5 % and 0.45 % NaCl with KCl 20 mEq   Intravenous Continuous 125 mL/hr at 01/14/25 0739 New Bag at 01/14/25 0739     Scheduled Meds:   mupirocin   Nasal BID     PRN Meds:  Current Facility-Administered Medications:     acetaminophen, 650 mg, Oral, Q8H PRN    [COMPLETED] diatrizoate meglumineand-diatrizoate sodium, 30 mL, Oral, ONCE PRN    HYDROmorphone, 1 mg, Intravenous, Q3H PRN    melatonin, 6 mg, Oral, Nightly PRN    ondansetron, 8 mg, Oral, Q8H PRN     Review of patient's allergies indicates:  No Known Allergies  Objective:     Vital Signs (Most Recent):  Temp: 98.6 °F (37 °C) (01/14/25 0424)  Pulse: (!) 135 (01/14/25 0530)  Resp: 19 (01/14/25 1021)  BP: (!) 140/75 (01/14/25 0530)  SpO2: (!) 92 % (01/14/25 0741) Vital Signs (24h Range):  Temp:  [98.6 °F (37 °C)-99.9 °F (37.7 °C)] 98.6 °F (37 °C)  Pulse:  [114-142] 135  Resp:  [14-35] 19  SpO2:  [91 %-96 %] 92 %  BP: (118-146)/(68-90) 140/75     Weight: 132.5 kg (292 lb)  Body mass index is 41.9 kg/m².    Intake/Output - Last 3 Shifts         01/12 0700  01/13 0659 01/13 0700 01/14 0659 01/14 0700  01/15 0659    I.V. (mL/kg) 1500 (13.2)      IV Piggyback 100 1000     Total Intake(mL/kg) 1600 (14.1) 1000 (7.6)     Urine (mL/kg/hr)  2500 (0.8)     Total Output  2500     Net +1600 -1500                     Physical Exam  Vitals and nursing note reviewed.   Constitutional:       Appearance: Normal appearance. He is obese.   HENT:      Head: Normocephalic.      Nose: Nose normal.      Mouth/Throat:      Mouth: Mucous membranes are moist.   Eyes:      Extraocular Movements: Extraocular movements intact.   Cardiovascular:      Rate and Rhythm: Regular rhythm. Tachycardia present.      Pulses:           Dorsalis pedis pulses are 1+ on the right side and 1+ on the left side.        Posterior tibial pulses are 1+ on the right side and 1+ on the left side.      Heart sounds:  "Normal heart sounds.      Comments: Sinus tach on EKG rate of 135bpm  Pulmonary:      Effort: Pulmonary effort is normal.      Breath sounds: Normal breath sounds.   Chest:      Chest wall: Tenderness present.   Abdominal:      General: Abdomen is protuberant. Bowel sounds are normal.      Palpations: Abdomen is soft.      Tenderness: There is generalized abdominal tenderness (generalized- more epigastric/ LUQ) and tenderness in the epigastric area and left upper quadrant.       Musculoskeletal:         General: Tenderness (left shoulder and right hip/ thigh) and signs of injury (left clavicle) present. Normal range of motion.      Cervical back: Normal range of motion and neck supple.   Skin:     General: Skin is warm and dry.      Capillary Refill: Capillary refill takes less than 2 seconds.   Neurological:      Mental Status: He is alert and oriented to person, place, and time.   Psychiatric:         Mood and Affect: Mood normal.          Significant Labs:  I have reviewed all pertinent lab results within the past 24 hours.  CBC:   Recent Labs   Lab 01/14/25  0152   WBC 18.36*   RBC 3.48*   HGB 11.0*   HCT 31.8*      MCV 91.4   MCH 31.6*   MCHC 34.6     CMP:   Recent Labs   Lab 01/14/25  0856   *   CALCIUM 8.6   ALBUMIN 3.1*   PROT 6.5   *   K 5.2*   CO2 24      BUN 8*   CREATININE 0.85   ALKPHOS 44   ALT 59*   AST 47*   BILITOT 0.9     Coagulation: No results for input(s): "LABPROT", "INR", "APTT" in the last 168 hours.    Significant Diagnostics:  I have reviewed all pertinent imaging results/findings within the past 24 hours.  "

## 2025-01-14 NOTE — PROGRESS NOTES
[facility]  [facility]    Patient Name:  Best Arguelles   MRN:  [id number]  Date: 1/14/2025  Diagnosis: Injury of spleen with hematoma    History:     History reviewed. No pertinent past medical history.    Social History     Socioeconomic History    Marital status:    Tobacco Use    Smoking status: Never    Smokeless tobacco: Current     Types: Snuff    Tobacco comments:     occasionally   Substance and Sexual Activity    Alcohol use: Not Currently     Comment: drinks on special occasions    Drug use: Never    Sexual activity: Not Currently     Social Drivers of Health     Financial Resource Strain: Low Risk  (1/14/2025)    Overall Financial Resource Strain (CARDIA)     Difficulty of Paying Living Expenses: Not hard at all   Food Insecurity: No Food Insecurity (1/14/2025)    Hunger Vital Sign     Worried About Running Out of Food in the Last Year: Never true     Ran Out of Food in the Last Year: Never true   Transportation Needs: No Transportation Needs (1/14/2025)    TRANSPORTATION NEEDS     Transportation : No   Stress: No Stress Concern Present (1/14/2025)    [employer] - Occupational Stress Questionnaire     Feeling of Stress : Not at all   Housing Stability: Low Risk  (1/14/2025)    Housing Stability Vital Sign     Unable to Pay for Housing in the Last Year: No     Homeless in the Last Year: No       Precautions:     Allergies as of 01/12/2025    (No Known Allergies)       WOC Assessment Details/Treatment     Narrative: Seen patient for initial preventative skin care measures.  Foam borders noted to bilateral heels and sacral.  No redness or open wounds noted.  Consult wound care of any findings.    01/14/2025

## 2025-01-14 NOTE — ED NOTES
Message sent to Dr. Puente and attempted to call x2    Patient is complaining of pain 10/10 to L lower quad of abdomen that is radiating across his whole abdomen. Abdomen is tender but not tight. pt state that he feels like he needs to urinate but the riley is draining without any problems. Morphine was given at 2046. Heart rate is 130-150's and temp 99. Is there any orders we need to do at this time. also is there any po medication that we can add for breakthrough pain between the 4 hours of morphine. He also is complaining of excessive thirst and continues with IV fluids at 125 and drinking plenty of fluids.

## 2025-01-14 NOTE — ED NOTES
Dr. Puente replied with new orders as noted.   CT Tech notified of pt's new orders for CT Abd/pelvis w/wo IV contrast and oral contrast.  Also changed medication for pain from morphine to Diluadid.  Pt made aware of the new orders.  Pt denies any acute problems at this time but states he is just not able to get comfortable.  Nurse explained to patient that we are going to repeat the CT per Dr. Puente due to his complaints and verify if he has any changes.  Pt denies any further complaints, call light in reach and bed in low position.

## 2025-01-15 PROBLEM — R07.9 CHEST PAIN: Status: ACTIVE | Noted: 2025-01-15

## 2025-01-15 PROBLEM — R00.0 TACHYCARDIA: Status: ACTIVE | Noted: 2025-01-15

## 2025-01-15 PROBLEM — E66.01 MORBID OBESITY: Status: ACTIVE | Noted: 2025-01-15

## 2025-01-15 LAB
ALBUMIN SERPL BCP-MCNC: 2.8 G/DL (ref 3.5–5)
ALBUMIN/GLOB SERPL: 1 {RATIO}
ALP SERPL-CCNC: 47 U/L (ref 40–150)
ALT SERPL W P-5'-P-CCNC: 43 U/L
ANION GAP SERPL CALCULATED.3IONS-SCNC: 12 MMOL/L (ref 7–16)
ANION GAP SERPL CALCULATED.3IONS-SCNC: 9 MMOL/L (ref 7–16)
AORTIC ROOT ANNULUS: 2.67 CM
AORTIC VALVE CUSP SEPERATION: 1.69 CM
ASCENDING AORTA: 2.46 CM
AST SERPL W P-5'-P-CCNC: 42 U/L (ref 5–34)
AV INDEX (PROSTH): 1.03
AV MEAN GRADIENT: 4.1 MMHG
AV PEAK GRADIENT: 9 MMHG
AV VELOCITY RATIO: 0.8
BASOPHILS # BLD AUTO: 0.02 K/UL (ref 0–0.2)
BASOPHILS NFR BLD AUTO: 0.1 % (ref 0–1)
BILIRUB SERPL-MCNC: 1.4 MG/DL
BSA FOR ECHO PROCEDURE: 2.37 M2
BUN SERPL-MCNC: 8 MG/DL (ref 9–21)
BUN SERPL-MCNC: 8 MG/DL (ref 9–21)
BUN/CREAT SERPL: 11 (ref 6–20)
BUN/CREAT SERPL: 11 (ref 6–20)
CALCIUM SERPL-MCNC: 7.8 MG/DL (ref 8.4–10.2)
CALCIUM SERPL-MCNC: 8.2 MG/DL (ref 8.4–10.2)
CHLORIDE SERPL-SCNC: 99 MMOL/L (ref 98–107)
CHLORIDE SERPL-SCNC: 99 MMOL/L (ref 98–107)
CHOLEST SERPL-MCNC: 92 MG/DL
CHOLEST/HDLC SERPL: 2.9 {RATIO}
CO2 SERPL-SCNC: 26 MMOL/L (ref 22–29)
CO2 SERPL-SCNC: 28 MMOL/L (ref 22–29)
CREAT SERPL-MCNC: 0.73 MG/DL (ref 0.72–1.25)
CREAT SERPL-MCNC: 0.74 MG/DL (ref 0.72–1.25)
CV ECHO LV RWT: 0.33 CM
DIFFERENTIAL METHOD BLD: ABNORMAL
DOP CALC AO PEAK VEL: 1.5 M/S
DOP CALC AO VTI: 21.9 CM
DOP CALC LVOT PEAK VEL: 1.2 M/S
DOP CALC MV VTI: 17.7 CM
DOP CALC RVOT PEAK VEL: 0.89 M/S
DOP CALC RVOT VTI: 12.4 CM
DOP CALCLVOT PEAK VEL VTI: 22.6 CM
E WAVE DECELERATION TIME: 149.92 MSEC
E/A RATIO: 0.94
E/E' RATIO: 5.09 M/S
ECHO LV POSTERIOR WALL: 0.8 CM (ref 0.6–1.1)
EGFR (NO RACE VARIABLE) (RUSH/TITUS): 125 ML/MIN/1.73M2
EGFR (NO RACE VARIABLE) (RUSH/TITUS): 126 ML/MIN/1.73M2
EOSINOPHIL # BLD AUTO: 0.03 K/UL (ref 0–0.5)
EOSINOPHIL NFR BLD AUTO: 0.2 % (ref 1–4)
ERYTHROCYTE [DISTWIDTH] IN BLOOD BY AUTOMATED COUNT: 12.5 % (ref 11.5–14.5)
EST. AVERAGE GLUCOSE BLD GHB EST-MCNC: 100 MG/DL
FRACTIONAL SHORTENING: 43.8 % (ref 28–44)
GLOBULIN SER-MCNC: 2.8 G/DL (ref 2–4)
GLUCOSE SERPL-MCNC: 105 MG/DL (ref 74–100)
GLUCOSE SERPL-MCNC: 112 MG/DL (ref 74–100)
HBA1C MFR BLD HPLC: 5.1 %
HCT VFR BLD AUTO: 29.4 % (ref 40–54)
HDLC SERPL-MCNC: 32 MG/DL (ref 35–60)
HGB BLD-MCNC: 9.8 G/DL (ref 13.5–18)
IMM GRANULOCYTES # BLD AUTO: 0.12 K/UL (ref 0–0.04)
IMM GRANULOCYTES NFR BLD: 0.7 % (ref 0–0.4)
INTERVENTRICULAR SEPTUM: 0.8 CM (ref 0.6–1.1)
IVRT: 62.8 MSEC
LDLC SERPL CALC-MCNC: 43 MG/DL
LDLC/HDLC SERPL: 1.3 {RATIO}
LEFT ATRIUM AREA SYSTOLIC (APICAL 2 CHAMBER): 16.81 CM2
LEFT ATRIUM AREA SYSTOLIC (APICAL 4 CHAMBER): 19.9 CM2
LEFT ATRIUM SIZE: 3.56 CM
LEFT ATRIUM VOLUME INDEX MOD: 19.7 ML/M2
LEFT ATRIUM VOLUME MOD: 48.25 ML
LEFT INTERNAL DIMENSION IN SYSTOLE: 2.7 CM (ref 2.1–4)
LEFT VENTRICLE DIASTOLIC VOLUME INDEX: 43.89 ML/M2
LEFT VENTRICLE DIASTOLIC VOLUME: 107.54 ML
LEFT VENTRICLE END SYSTOLIC VOLUME APICAL 2 CHAMBER: 42.04 ML
LEFT VENTRICLE END SYSTOLIC VOLUME APICAL 4 CHAMBER: 51.36 ML
LEFT VENTRICLE MASS INDEX: 51.7 G/M2
LEFT VENTRICLE SYSTOLIC VOLUME INDEX: 10.9 ML/M2
LEFT VENTRICLE SYSTOLIC VOLUME: 26.79 ML
LEFT VENTRICULAR INTERNAL DIMENSION IN DIASTOLE: 4.8 CM (ref 3.5–6)
LEFT VENTRICULAR MASS: 126.7 G
LV LATERAL E/E' RATIO: 4.67 M/S
LV SEPTAL E/E' RATIO: 5.6 M/S
LVED V (TEICH): 107.54 ML
LVES V (TEICH): 26.79 ML
LVOT MG: 3.26 MMHG
LVOT MV: 0.86 CM/S
LYMPHOCYTES # BLD AUTO: 1.2 K/UL (ref 1–4.8)
LYMPHOCYTES NFR BLD AUTO: 7.4 % (ref 27–41)
MAGNESIUM SERPL-MCNC: 1.9 MG/DL (ref 1.6–2.6)
MCH RBC QN AUTO: 31 PG (ref 27–31)
MCHC RBC AUTO-ENTMCNC: 33.3 G/DL (ref 32–36)
MCV RBC AUTO: 93 FL (ref 80–96)
MONOCYTES # BLD AUTO: 2.62 K/UL (ref 0–0.8)
MONOCYTES NFR BLD AUTO: 16.1 % (ref 2–6)
MPC BLD CALC-MCNC: 9.3 FL (ref 9.4–12.4)
MV MEAN GRADIENT: 3 MMHG
MV PEAK A VEL: 0.89 M/S
MV PEAK E VEL: 0.84 M/S
MV PEAK GRADIENT: 4 MMHG
MV STENOSIS PRESSURE HALF TIME: 43.48 MS
MV VALVE AREA P 1/2 METHOD: 5.06 CM2
NEUTROPHILS # BLD AUTO: 12.3 K/UL (ref 1.8–7.7)
NEUTROPHILS NFR BLD AUTO: 75.5 % (ref 53–65)
NONHDLC SERPL-MCNC: 60 MG/DL
NRBC # BLD AUTO: 0 X10E3/UL
NRBC, AUTO (.00): 0 %
OHS CV RV/LV RATIO: 0.6 CM
PHOSPHATE SERPL-MCNC: 2.5 MG/DL (ref 2.3–4.7)
PISA TR MAX VEL: 3.3 M/S
PLATELET # BLD AUTO: 209 K/UL (ref 150–400)
POTASSIUM SERPL-SCNC: 4.2 MMOL/L (ref 3.5–5.1)
POTASSIUM SERPL-SCNC: 4.3 MMOL/L (ref 3.5–5.1)
PROT SERPL-MCNC: 5.6 G/DL (ref 6.4–8.3)
PV MEAN GRADIENT: 2 MMHG
PV MV: 0.82 M/S
PV PEAK GRADIENT: 5 MMHG
PV PEAK VELOCITY: 1.12 M/S
RA PRESSURE ESTIMATED: 3 MMHG
RA VOL SYS: 52.51 ML
RBC # BLD AUTO: 3.16 M/UL (ref 4.6–6.2)
RIGHT ATRIAL AREA: 18.9 CM2
RIGHT ATRIUM VOLUME AREA LENGTH APICAL 4 CHAMBER: 49.69 ML
RIGHT VENTRICLE DIASTOLIC BASEL DIMENSION: 2.9 CM
RIGHT VENTRICLE DIASTOLIC LENGTH: 6.7 CM
RIGHT VENTRICLE DIASTOLIC MID DIMENSION: 1.8 CM
RIGHT VENTRICULAR END-DIASTOLIC DIMENSION: 2.85 CM
RIGHT VENTRICULAR LENGTH IN DIASTOLE (APICAL 4-CHAMBER VIEW): 6.65 CM
RV MID DIAMA: 1.84 CM
RV TB RVSP: 6 MMHG
SODIUM SERPL-SCNC: 132 MMOL/L (ref 136–145)
SODIUM SERPL-SCNC: 133 MMOL/L (ref 136–145)
STJ: 2.28 CM
TDI LATERAL: 0.18 M/S
TDI SEPTAL: 0.15 M/S
TDI: 0.17 M/S
TR MAX PG: 44 MMHG
TRICUSPID ANNULAR PLANE SYSTOLIC EXCURSION: 2.37 CM
TRIGL SERPL-MCNC: 84 MG/DL (ref 34–140)
TV REST PULMONARY ARTERY PRESSURE: 47 MMHG
VLDLC SERPL-MCNC: 17 MG/DL
WBC # BLD AUTO: 16.29 K/UL (ref 4.5–11)
Z-SCORE OF LEFT VENTRICULAR DIMENSION IN END DIASTOLE: -9.39
Z-SCORE OF LEFT VENTRICULAR DIMENSION IN END SYSTOLE: -7.88

## 2025-01-15 PROCEDURE — 11000001 HC ACUTE MED/SURG PRIVATE ROOM

## 2025-01-15 PROCEDURE — 85025 COMPLETE CBC W/AUTO DIFF WBC: CPT | Performed by: HOSPITALIST

## 2025-01-15 PROCEDURE — 97110 THERAPEUTIC EXERCISES: CPT

## 2025-01-15 PROCEDURE — 97116 GAIT TRAINING THERAPY: CPT

## 2025-01-15 PROCEDURE — 27000221 HC OXYGEN, UP TO 24 HOURS

## 2025-01-15 PROCEDURE — 25000003 PHARM REV CODE 250: Performed by: HOSPITALIST

## 2025-01-15 PROCEDURE — 80061 LIPID PANEL: CPT | Performed by: HOSPITALIST

## 2025-01-15 PROCEDURE — 25000003 PHARM REV CODE 250: Performed by: INTERNAL MEDICINE

## 2025-01-15 PROCEDURE — 94761 N-INVAS EAR/PLS OXIMETRY MLT: CPT

## 2025-01-15 PROCEDURE — 36415 COLL VENOUS BLD VENIPUNCTURE: CPT | Performed by: HOSPITALIST

## 2025-01-15 PROCEDURE — 25000003 PHARM REV CODE 250: Performed by: SURGERY

## 2025-01-15 PROCEDURE — 97165 OT EVAL LOW COMPLEX 30 MIN: CPT

## 2025-01-15 PROCEDURE — 84145 PROCALCITONIN (PCT): CPT

## 2025-01-15 PROCEDURE — 83036 HEMOGLOBIN GLYCOSYLATED A1C: CPT | Performed by: HOSPITALIST

## 2025-01-15 PROCEDURE — 83735 ASSAY OF MAGNESIUM: CPT | Performed by: HOSPITALIST

## 2025-01-15 PROCEDURE — 84100 ASSAY OF PHOSPHORUS: CPT | Performed by: HOSPITALIST

## 2025-01-15 PROCEDURE — 97530 THERAPEUTIC ACTIVITIES: CPT

## 2025-01-15 PROCEDURE — 80053 COMPREHEN METABOLIC PANEL: CPT | Performed by: HOSPITALIST

## 2025-01-15 PROCEDURE — 63600175 PHARM REV CODE 636 W HCPCS

## 2025-01-15 PROCEDURE — 63600175 PHARM REV CODE 636 W HCPCS: Mod: TB | Performed by: SURGERY

## 2025-01-15 PROCEDURE — 99900035 HC TECH TIME PER 15 MIN (STAT)

## 2025-01-15 RX ORDER — SODIUM CHLORIDE, SODIUM LACTATE, POTASSIUM CHLORIDE, CALCIUM CHLORIDE 600; 310; 30; 20 MG/100ML; MG/100ML; MG/100ML; MG/100ML
INJECTION, SOLUTION INTRAVENOUS CONTINUOUS
Status: DISPENSED | OUTPATIENT
Start: 2025-01-15 | End: 2025-01-16

## 2025-01-15 RX ORDER — ALUMINUM HYDROXIDE, MAGNESIUM HYDROXIDE, AND SIMETHICONE 2400; 240; 2400 MG/30ML; MG/30ML; MG/30ML
30 SUSPENSION ORAL EVERY 6 HOURS PRN
Status: DISCONTINUED | OUTPATIENT
Start: 2025-01-15 | End: 2025-01-18 | Stop reason: HOSPADM

## 2025-01-15 RX ADMIN — HYDROMORPHONE HYDROCHLORIDE 1 MG: 2 INJECTION, SOLUTION INTRAMUSCULAR; INTRAVENOUS; SUBCUTANEOUS at 05:01

## 2025-01-15 RX ADMIN — SODIUM CHLORIDE: 4.5 INJECTION, SOLUTION INTRAVENOUS at 11:01

## 2025-01-15 RX ADMIN — SODIUM CHLORIDE, POTASSIUM CHLORIDE, SODIUM LACTATE AND CALCIUM CHLORIDE: 600; 310; 30; 20 INJECTION, SOLUTION INTRAVENOUS at 05:01

## 2025-01-15 RX ADMIN — ALUMINUM HYDROXIDE, MAGNESIUM HYDROXIDE, AND DIMETHICONE 30 ML: 400; 400; 40 SUSPENSION ORAL at 08:01

## 2025-01-15 RX ADMIN — HYDROMORPHONE HYDROCHLORIDE 1 MG: 2 INJECTION, SOLUTION INTRAMUSCULAR; INTRAVENOUS; SUBCUTANEOUS at 03:01

## 2025-01-15 RX ADMIN — HYDROMORPHONE HYDROCHLORIDE 1 MG: 2 INJECTION, SOLUTION INTRAMUSCULAR; INTRAVENOUS; SUBCUTANEOUS at 01:01

## 2025-01-15 RX ADMIN — MUPIROCIN: 20 OINTMENT TOPICAL at 08:01

## 2025-01-15 RX ADMIN — HYDROCODONE BITARTRATE AND ACETAMINOPHEN 1 TABLET: 7.5; 325 TABLET ORAL at 10:01

## 2025-01-15 RX ADMIN — HYDROCODONE BITARTRATE AND ACETAMINOPHEN 1 TABLET: 7.5; 325 TABLET ORAL at 06:01

## 2025-01-15 RX ADMIN — SODIUM CHLORIDE: 4.5 INJECTION, SOLUTION INTRAVENOUS at 03:01

## 2025-01-15 RX ADMIN — MUPIROCIN: 20 OINTMENT TOPICAL at 09:01

## 2025-01-15 RX ADMIN — HYDROMORPHONE HYDROCHLORIDE 1 MG: 2 INJECTION, SOLUTION INTRAMUSCULAR; INTRAVENOUS; SUBCUTANEOUS at 08:01

## 2025-01-15 RX ADMIN — HYDROCODONE BITARTRATE AND ACETAMINOPHEN 1 TABLET: 7.5; 325 TABLET ORAL at 12:01
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
## 2025-01-15 NOTE — PROGRESS NOTES
Patient had episode of severe right knee pain when being assisted to beside chair last p.m..  Right knee x-ray was ordered and was negative for fracture or dislocation.  Right knee immobilizer was ordered but was not in place on exam.  Patient still complains of right knee pain.  This was discussed with Dr. Bashir who has previously consulted and he recommends MRI of right knee.  Overall patient's pain has improved.  We will continue PT and OT.  MRI right knee ordered.
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
## 2025-01-15 NOTE — SUBJECTIVE & OBJECTIVE
Interval history:    Review of Systems   Constitutional:  Negative for activity change, chills, fatigue and fever.   HENT:  Negative for congestion and sore throat.    Respiratory:  Negative for chest tightness.    Gastrointestinal:  Positive for abdominal pain. Negative for abdominal distention and diarrhea.   Endocrine: Negative for cold intolerance and heat intolerance.   Genitourinary:  Negative for dysuria.   Musculoskeletal:  Negative for arthralgias and back pain.        Shoulder pain     Skin:  Negative for color change and rash.   Neurological:  Negative for dizziness, tremors and headaches.   Psychiatric/Behavioral:  Negative for agitation. The patient is not nervous/anxious.      Objective:     Vital Signs (Most Recent):  Temp: 99.2 °F (37.3 °C) (01/15/25 0454)  Pulse: (!) 117 (01/15/25 0454)  Resp: 20 (01/15/25 1335)  BP: 124/65 (01/15/25 0454)  SpO2: (!) 94 % (01/15/25 0454) Vital Signs (24h Range):  Temp:  [98 °F (36.7 °C)-100.1 °F (37.8 °C)] 99.2 °F (37.3 °C)  Pulse:  [117-134] 117  Resp:  [18-20] 20  SpO2:  [93 %-96 %] 94 %  BP: (110-126)/(62-75) 124/65     Weight: 132.5 kg (292 lb)  Body mass index is 41.9 kg/m².     Physical Exam  Vitals and nursing note reviewed.   Constitutional:       Appearance: Normal appearance.   HENT:      Head: Normocephalic.      Right Ear: Tympanic membrane normal.      Nose: Nose normal.   Cardiovascular:      Rate and Rhythm: Regular rhythm. Tachycardia present.      Pulses: Normal pulses.      Heart sounds: Normal heart sounds.   Pulmonary:      Effort: Pulmonary effort is normal.      Breath sounds: Normal breath sounds.   Chest:      Chest wall: Tenderness present.   Abdominal:      General: Abdomen is flat. Bowel sounds are normal.      Palpations: Abdomen is soft.      Tenderness: There is abdominal tenderness.   Musculoskeletal:         General: No swelling.      Cervical back: Normal range of motion.      Right lower leg: No edema.      Left lower leg: No edema.    Skin:     General: Skin is warm and dry.   Neurological:      General: No focal deficit present.      Mental Status: He is alert and oriented to person, place, and time.   Psychiatric:         Mood and Affect: Mood normal.          Significant Labs: All pertinent labs within the past 24 hours have been reviewed.  BMP:   Recent Labs   Lab 01/15/25  0452   *   *   K 4.2   CL 99   CO2 26   BUN 8*   CREATININE 0.73   CALCIUM 7.8*   MG 1.9     CBC:   Recent Labs   Lab 01/13/25  1653 01/14/25  0152 01/15/25  0453   WBC 14.36* 18.36* 16.29*   HGB 11.7* 11.0* 9.8*   HCT 34.2* 31.8* 29.4*    196 209     CMP:   Recent Labs   Lab 01/14/25  0856 01/14/25  2358 01/15/25  0452   * 132* 133*   K 5.2* 4.3 4.2    99 99   CO2 24 28 26   * 105* 112*   BUN 8* 8* 8*   CREATININE 0.85 0.74 0.73   CALCIUM 8.6 8.2* 7.8*   PROT 6.5  --  5.6*   ALBUMIN 3.1*  --  2.8*   BILITOT 0.9  --  1.4   ALKPHOS 44  --  47   AST 47*  --  42*   ALT 59*  --  43   ANIONGAP 11 9 12       Significant Imaging: I have reviewed all pertinent imaging results/findings within the past 24 hours.
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
## 2025-01-15 NOTE — PLAN OF CARE
Problem: Occupational Therapy  Goal: Occupational Therapy Goal  Description: ST.Pt will perform bathing with Kike with setup at EOB  2.Pt will perform UE dressing with Kike  3.Pt will perform LE dressing with Kike  4.Pt will transfer bed/chair/bsc with CGA with AD if needed  5.Pt will perform standing task x 2 min with CGA with AD  6.Tolerate 15 min of tx without fatigue.      LTG:   Restore to max I with selfcare and mobility.      Outcome: Progressing
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
## 2025-01-15 NOTE — ASSESSMENT & PLAN NOTE
Troponin and EKG are negative.    Most likely related to rib fractures.  Much less likely to be cardiac in nature the patient does have family history of early heart disease and diabetes so will risk stratify.  Follow up with echocardiogram.

## 2025-01-15 NOTE — ASSESSMENT & PLAN NOTE
Likely related to pain.  Shortness of breath persists may consider CTA.    Follow-up echocardiogram to evaluate for pericardial effusion or other etiology.

## 2025-01-15 NOTE — PLAN OF CARE
Problem: Adult Inpatient Plan of Care  Goal: Plan of Care Review  Outcome: Progressing  Goal: Patient-Specific Goal (Individualized)  Outcome: Progressing  Goal: Absence of Hospital-Acquired Illness or Injury  Outcome: Progressing  Goal: Optimal Comfort and Wellbeing  Outcome: Progressing  Goal: Readiness for Transition of Care  Outcome: Progressing     Problem: Infection  Goal: Absence of Infection Signs and Symptoms  Outcome: Progressing     Problem: Skin Injury Risk Increased  Goal: Skin Health and Integrity  Outcome: Progressing     Problem: Bariatric Environmental Safety  Goal: Safety Maintained with Care  Outcome: Progressing     Problem: Fall Injury Risk  Goal: Absence of Fall and Fall-Related Injury  Outcome: Progressing

## 2025-01-15 NOTE — PLAN OF CARE
Problem: Physical Therapy  Goal: Physical Therapy Goal  Description: Short Term Goals  Independent with HEP  Independent with ambulation x 100 feet  Independent supine to sit    Long term goals  Needed equipment for home.       Outcome: Progressing

## 2025-01-15 NOTE — PLAN OF CARE
Problem: Adult Inpatient Plan of Care  Goal: Plan of Care Review  Outcome: Progressing  Goal: Patient-Specific Goal (Individualized)  Outcome: Progressing  Goal: Absence of Hospital-Acquired Illness or Injury  Outcome: Progressing  Goal: Optimal Comfort and Wellbeing  Outcome: Progressing  Goal: Readiness for Transition of Care  Outcome: Progressing     Problem: Infection  Goal: Absence of Infection Signs and Symptoms  Outcome: Progressing     Problem: Skin Injury Risk Increased  Goal: Skin Health and Integrity  Outcome: Progressing     Problem: Bariatric Environmental Safety  Goal: Safety Maintained with Care  Outcome: Progressing     Problem: Fall Injury Risk  Goal: Absence of Fall and Fall-Related Injury  Outcome: Progressing     Problem: Gas Exchange Impaired  Goal: Optimal Gas Exchange  Outcome: Progressing

## 2025-01-15 NOTE — PT/OT/SLP PROGRESS
Physical Therapy Treatment    Patient Name:  Best Arguelles   MRN:  [id number]    Recommendations:     Discharge Recommendations: No Therapy Indicated  Discharge Equipment Recommendations: crutches, cane, straight  Barriers to discharge: None    Assessment:     Best Arguelles is a 29 y.o. male admitted with a medical diagnosis of Injury of spleen with hematoma.  He presents with the following impairments/functional limitations: pain, decreased ROM, gait instability, impaired functional mobility Patient with continued right knee edema and buckling. May need MRI for further evaluation. Patient is able to walk short distances with hand held assist. May need cane for support. Immobilizer ordered per physician.    Rehab Prognosis: Good; patient would benefit from acute skilled PT services to address these deficits and reach maximum level of function.    Recent Surgery: * No surgery found *      Plan:     During this hospitalization, patient to be seen daily to address the identified rehab impairments via gait training, therapeutic activities, therapeutic exercises and progress toward the following goals:    Plan of Care Expires:  01/14/25    Subjective     Chief Complaint: right knee pain and buckling  Patient/Family Comments/goals: Patient states his pain is better with pain meds. Still feels unstable at right knee.  Pain/Comfort:  Pain Rating 1: 2/10  Location - Side 1: Right  Location 1: knee  Pain Addressed 1: Pre-medicate for activity, Cessation of Activity      Objective:     Communicated with nurse prior to session.  Patient found supine with peripheral IV, telemetry upon PT entry to room.     General Precautions: Standard, fall  Orthopedic Precautions: N/A  Braces:    Respiratory Status: Room air     Functional Mobility:  Bed Mobility:     Supine to Sit: minimum assistance  Sit to Supine: minimum assistance  Transfers:     Sit to Stand:  minimum assistance with hand-held assist  Gait: ambulated 50 feet hand held  assist. Antalgic gait. Limited by fatigue      AM-PAC 6 CLICK MOBILITY  Turning over in bed (including adjusting bedclothes, sheets and blankets)?: 3  Sitting down on and standing up from a chair with arms (e.g., wheelchair, bedside commode, etc.): 3  Moving from lying on back to sitting on the side of the bed?: 3  Need to walk in hospital room?: 3  Climbing 3-5 steps with a railing?: 1       Treatment & Education:  Transferred to toilet. Set up for cleaning and needed min assist for cleaning. Min assist with transfers on and off the toilet.     Patient left up in chair with call button in reach..    GOALS:   Multidisciplinary Problems       Physical Therapy Goals          Problem: Physical Therapy    Goal Priority Disciplines Outcome Interventions   Physical Therapy Goal     PT, PT/OT Progressing    Description: Short Term Goals  Independent with HEP  Independent with ambulation x 100 feet  Independent supine to sit    Long term goals  Needed equipment for home.                            Time Tracking:     PT Received On: 01/15/25  PT Start Time: 0940     PT Stop Time: 1005  PT Total Time (min): 25 min     Billable Minutes: Gait Training 10 and Therapeutic activity 15    Treatment Type: Treatment  PT/PTA: PT     Number of PTA visits since last PT visit: 0     01/15/2025
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
## 2025-01-15 NOTE — ASSESSMENT & PLAN NOTE
Patient's hemorrhage is due to trauma/laceration, patient does not have a propensity for bleeding.. Patients most recent Hgb, Hct, platelets, and INR are listed below.  Recent Labs     01/13/25  0511 01/13/25  1653 01/14/25  0152   HGB 12.9* 11.7* 11.0*   HCT 37.9* 34.2* 31.8*    213 196     Plan  - Will trend hemoglobin/hematocrit Daily  - Will monitor and correct any coagulation defects  - Will transfuse if Hgb is <7g/dl (<8g/dl in cases of active ACS) or if patient has rapid bleeding leading to hemodynamic instability    Management per General surgery

## 2025-01-15 NOTE — HOSPITAL COURSE
1/15  - chest pain likely MSK related, reviewed echo, EKG, trops, labs which are all benign  - mild improvement in tachycardia with some fluids, will change to LR and run overnight  - I will continue to follow while patient is admitted     1/16  - febrile overnight with 103 fever, increase in O2 demand as well however patient denies SOB and has removed oxygen. Xray reviewed, left pleural effusion remains but is small.   - agree with zosyn, white count has been high but likely reactive, will get procal as well to help determine current acute infection and antibiotic effectiveness if needed at a later date  - chest pain looks like pleurisy, if effusion continues it might be worth tapping for fluid studies     1/17  - discharging today, no additional recommendations per my standpoint

## 2025-01-15 NOTE — ASSESSMENT & PLAN NOTE
Body mass index is 41.9 kg/m². Morbid obesity complicates all aspects of disease management from diagnostic modalities to treatment. Weight loss encouraged and health benefits explained to patient.

## 2025-01-15 NOTE — ASSESSMENT & PLAN NOTE
Troponin and EKG are negative.    Most likely related to rib fractures.  Much less likely to be cardiac in nature the patient does have family history of early heart disease and diabetes so will risk stratify.  Follow up with echocardiogram.    1/15  - reviewed all labs, EKG, echo, likely MSK related, however due to overall stress and clinical picture could be gerd related as well, will try protonix trial

## 2025-01-15 NOTE — SUBJECTIVE & OBJECTIVE
History reviewed. No pertinent past medical history.    Past Surgical History:   Procedure Laterality Date    LAPAROSCOPIC APPENDECTOMY N/A 04/15/2022    Procedure: APPENDECTOMY, LAPAROSCOPIC;  Surgeon: Leandro Clarke MD;  Location: [facility];  Service: General;  Laterality: N/A;       Review of patient's allergies indicates:  No Known Allergies    No current facility-administered medications on file prior to encounter.     No current outpatient medications on file prior to encounter.     Family History    None       Tobacco Use    Smoking status: Never    Smokeless tobacco: Current     Types: Snuff    Tobacco comments:     occasionally   Substance and Sexual Activity    Alcohol use: Not Currently     Comment: drinks on special occasions    Drug use: Never    Sexual activity: Not Currently     Review of Systems   Constitutional:  Negative for activity change, chills, fatigue and fever.   HENT:  Negative for congestion and sore throat.    Respiratory:  Negative for chest tightness.    Cardiovascular:  Positive for chest pain.   Gastrointestinal:  Positive for abdominal pain. Negative for abdominal distention and diarrhea.   Endocrine: Negative for cold intolerance and heat intolerance.   Genitourinary:  Negative for dysuria.   Musculoskeletal:  Negative for arthralgias and back pain.        Shoulder pain     Skin:  Negative for color change and rash.   Neurological:  Negative for dizziness, tremors and headaches.   Psychiatric/Behavioral:  Negative for agitation. The patient is not nervous/anxious.      Objective:     Vital Signs (Most Recent):  Temp: 100.1 °F (37.8 °C) (01/15/25 0104)  Pulse: (!) 125 (01/15/25 0104)  Resp: 18 (01/15/25 0005)  BP: 110/62 (01/15/25 0104)  SpO2: 96 % (01/15/25 0104) Vital Signs (24h Range):  Temp:  [98 °F (36.7 °C)-100.1 °F (37.8 °C)] 100.1 °F (37.8 °C)  Pulse:  [120-141] 125  Resp:  [16-24] 18  SpO2:  [91 %-96 %] 96 %  BP: (110-140)/(62-75) 110/62     Weight: 132.5 kg (292 lb)  Body  mass index is 41.9 kg/m².     Physical Exam  Vitals and nursing note reviewed.   Constitutional:       Appearance: Normal appearance.   HENT:      Head: Normocephalic.      Right Ear: Tympanic membrane normal.      Nose: Nose normal.   Cardiovascular:      Rate and Rhythm: Regular rhythm. Tachycardia present.      Pulses: Normal pulses.      Heart sounds: Normal heart sounds.   Pulmonary:      Effort: Pulmonary effort is normal.      Breath sounds: Normal breath sounds.   Chest:      Chest wall: Tenderness present.   Abdominal:      General: Abdomen is flat. Bowel sounds are normal.      Palpations: Abdomen is soft.      Tenderness: There is abdominal tenderness.   Musculoskeletal:         General: No swelling.      Cervical back: Normal range of motion.      Right lower leg: No edema.      Left lower leg: No edema.   Skin:     General: Skin is warm and dry.   Neurological:      General: No focal deficit present.      Mental Status: He is alert and oriented to person, place, and time.   Psychiatric:         Mood and Affect: Mood normal.          Significant Labs: All pertinent labs within the past 24 hours have been reviewed.    Significant Imaging: I have reviewed all pertinent imaging results/findings within the past 24 hours.

## 2025-01-15 NOTE — ASSESSMENT & PLAN NOTE
Patient's hemorrhage is due to trauma/laceration, patient does not have a propensity for bleeding.. Patients most recent Hgb, Hct, platelets, and INR are listed below.  Recent Labs     01/13/25  1653 01/14/25  0152 01/15/25  0453   HGB 11.7* 11.0* 9.8*   HCT 34.2* 31.8* 29.4*    196 209       Plan  - Will trend hemoglobin/hematocrit Daily  - Will monitor and correct any coagulation defects  - Will transfuse if Hgb is <7g/dl (<8g/dl in cases of active ACS) or if patient has rapid bleeding leading to hemodynamic instability    Management per General surgery

## 2025-01-16 PROBLEM — J18.9 PNEUMONIA: Status: ACTIVE | Noted: 2025-01-16

## 2025-01-16 LAB
ALBUMIN SERPL BCP-MCNC: 2.6 G/DL (ref 3.5–5)
ALBUMIN/GLOB SERPL: 0.7 {RATIO}
ALP SERPL-CCNC: 60 U/L (ref 40–150)
ALT SERPL W P-5'-P-CCNC: 41 U/L
ANION GAP SERPL CALCULATED.3IONS-SCNC: 12 MMOL/L (ref 7–16)
AST SERPL W P-5'-P-CCNC: 48 U/L (ref 5–34)
BASOPHILS # BLD AUTO: 0.03 K/UL (ref 0–0.2)
BASOPHILS NFR BLD AUTO: 0.2 % (ref 0–1)
BILIRUB SERPL-MCNC: 1.3 MG/DL
BUN SERPL-MCNC: 9 MG/DL (ref 9–21)
BUN/CREAT SERPL: 14 (ref 6–20)
CALCIUM SERPL-MCNC: 8.4 MG/DL (ref 8.4–10.2)
CHLORIDE SERPL-SCNC: 98 MMOL/L (ref 98–107)
CO2 SERPL-SCNC: 27 MMOL/L (ref 22–29)
CREAT SERPL-MCNC: 0.66 MG/DL (ref 0.72–1.25)
DIFFERENTIAL METHOD BLD: ABNORMAL
EGFR (NO RACE VARIABLE) (RUSH/TITUS): 129 ML/MIN/1.73M2
EOSINOPHIL # BLD AUTO: 0.19 K/UL (ref 0–0.5)
EOSINOPHIL NFR BLD AUTO: 1.3 % (ref 1–4)
ERYTHROCYTE [DISTWIDTH] IN BLOOD BY AUTOMATED COUNT: 12.6 % (ref 11.5–14.5)
GLOBULIN SER-MCNC: 3.8 G/DL (ref 2–4)
GLUCOSE SERPL-MCNC: 95 MG/DL (ref 74–100)
HCT VFR BLD AUTO: 27.9 % (ref 40–54)
HGB BLD-MCNC: 9.4 G/DL (ref 13.5–18)
IMM GRANULOCYTES # BLD AUTO: 0.13 K/UL (ref 0–0.04)
IMM GRANULOCYTES NFR BLD: 0.9 % (ref 0–0.4)
LYMPHOCYTES # BLD AUTO: 1.52 K/UL (ref 1–4.8)
LYMPHOCYTES NFR BLD AUTO: 10.1 % (ref 27–41)
MAGNESIUM SERPL-MCNC: 2.2 MG/DL (ref 1.6–2.6)
MCH RBC QN AUTO: 31 PG (ref 27–31)
MCHC RBC AUTO-ENTMCNC: 33.7 G/DL (ref 32–36)
MCV RBC AUTO: 92.1 FL (ref 80–96)
MONOCYTES # BLD AUTO: 2.42 K/UL (ref 0–0.8)
MONOCYTES NFR BLD AUTO: 16.1 % (ref 2–6)
MPC BLD CALC-MCNC: 9.9 FL (ref 9.4–12.4)
NEUTROPHILS # BLD AUTO: 10.74 K/UL (ref 1.8–7.7)
NEUTROPHILS NFR BLD AUTO: 71.4 % (ref 53–65)
NRBC # BLD AUTO: 0.02 X10E3/UL
NRBC, AUTO (.00): 0.1 %
PHOSPHATE SERPL-MCNC: 2.4 MG/DL (ref 2.3–4.7)
PLATELET # BLD AUTO: 256 K/UL (ref 150–400)
POTASSIUM SERPL-SCNC: 4.1 MMOL/L (ref 3.5–5.1)
PROCALCITONIN SERPL-MCNC: 0.55 NG/ML
PROT SERPL-MCNC: 6.4 G/DL (ref 6.4–8.3)
RBC # BLD AUTO: 3.03 M/UL (ref 4.6–6.2)
SODIUM SERPL-SCNC: 133 MMOL/L (ref 136–145)
WBC # BLD AUTO: 15.03 K/UL (ref 4.5–11)

## 2025-01-16 PROCEDURE — 99900035 HC TECH TIME PER 15 MIN (STAT)

## 2025-01-16 PROCEDURE — 11000001 HC ACUTE MED/SURG PRIVATE ROOM

## 2025-01-16 PROCEDURE — 63600175 PHARM REV CODE 636 W HCPCS: Mod: TB | Performed by: SURGERY

## 2025-01-16 PROCEDURE — 63600175 PHARM REV CODE 636 W HCPCS: Mod: TB | Performed by: INTERNAL MEDICINE

## 2025-01-16 PROCEDURE — 94761 N-INVAS EAR/PLS OXIMETRY MLT: CPT

## 2025-01-16 PROCEDURE — 80053 COMPREHEN METABOLIC PANEL: CPT | Performed by: HOSPITALIST

## 2025-01-16 PROCEDURE — 63600175 PHARM REV CODE 636 W HCPCS: Performed by: NURSE PRACTITIONER

## 2025-01-16 PROCEDURE — 84100 ASSAY OF PHOSPHORUS: CPT | Performed by: HOSPITALIST

## 2025-01-16 PROCEDURE — 85025 COMPLETE CBC W/AUTO DIFF WBC: CPT | Performed by: HOSPITALIST

## 2025-01-16 PROCEDURE — 27000221 HC OXYGEN, UP TO 24 HOURS

## 2025-01-16 PROCEDURE — 36415 COLL VENOUS BLD VENIPUNCTURE: CPT | Performed by: HOSPITALIST

## 2025-01-16 PROCEDURE — 25000003 PHARM REV CODE 250: Performed by: HOSPITALIST

## 2025-01-16 PROCEDURE — 25000003 PHARM REV CODE 250: Performed by: SURGERY

## 2025-01-16 PROCEDURE — 25000003 PHARM REV CODE 250: Performed by: NURSE PRACTITIONER

## 2025-01-16 PROCEDURE — 83735 ASSAY OF MAGNESIUM: CPT | Performed by: HOSPITALIST

## 2025-01-16 RX ORDER — HYDROMORPHONE HYDROCHLORIDE 2 MG/ML
1 INJECTION, SOLUTION INTRAMUSCULAR; INTRAVENOUS; SUBCUTANEOUS ONCE
Status: COMPLETED | OUTPATIENT
Start: 2025-01-16 | End: 2025-01-16

## 2025-01-16 RX ADMIN — HYDROCODONE BITARTRATE AND ACETAMINOPHEN 1 TABLET: 7.5; 325 TABLET ORAL at 05:01

## 2025-01-16 RX ADMIN — ACETAMINOPHEN 650 MG: 325 TABLET ORAL at 08:01

## 2025-01-16 RX ADMIN — HYDROCODONE BITARTRATE AND ACETAMINOPHEN 1 TABLET: 7.5; 325 TABLET ORAL at 11:01

## 2025-01-16 RX ADMIN — PIPERACILLIN SODIUM AND TAZOBACTAM SODIUM 4.5 G: 4; .5 INJECTION, POWDER, LYOPHILIZED, FOR SOLUTION INTRAVENOUS at 11:01

## 2025-01-16 RX ADMIN — HYDROMORPHONE HYDROCHLORIDE 1 MG: 2 INJECTION, SOLUTION INTRAMUSCULAR; INTRAVENOUS; SUBCUTANEOUS at 06:01

## 2025-01-16 RX ADMIN — PIPERACILLIN SODIUM AND TAZOBACTAM SODIUM 4.5 G: 4; .5 INJECTION, POWDER, LYOPHILIZED, FOR SOLUTION INTRAVENOUS at 06:01

## 2025-01-16 RX ADMIN — HYDROMORPHONE HYDROCHLORIDE 1 MG: 2 INJECTION, SOLUTION INTRAMUSCULAR; INTRAVENOUS; SUBCUTANEOUS at 09:01

## 2025-01-16 RX ADMIN — HYDROMORPHONE HYDROCHLORIDE 1 MG: 2 INJECTION, SOLUTION INTRAMUSCULAR; INTRAVENOUS; SUBCUTANEOUS at 02:01

## 2025-01-16 RX ADMIN — HYDROMORPHONE HYDROCHLORIDE 1 MG: 2 INJECTION, SOLUTION INTRAMUSCULAR; INTRAVENOUS; SUBCUTANEOUS at 03:01

## 2025-01-16 RX ADMIN — HYDROMORPHONE HYDROCHLORIDE 1 MG: 2 INJECTION, SOLUTION INTRAMUSCULAR; INTRAVENOUS; SUBCUTANEOUS at 11:01

## 2025-01-16 RX ADMIN — ALUMINUM HYDROXIDE, MAGNESIUM HYDROXIDE, AND DIMETHICONE 30 ML: 400; 400; 40 SUSPENSION ORAL at 05:01

## 2025-01-16 RX ADMIN — HYDROMORPHONE HYDROCHLORIDE 1 MG: 2 INJECTION, SOLUTION INTRAMUSCULAR; INTRAVENOUS; SUBCUTANEOUS at 12:01

## 2025-01-16 RX ADMIN — HYDROMORPHONE HYDROCHLORIDE 1 MG: 2 INJECTION, SOLUTION INTRAMUSCULAR; INTRAVENOUS; SUBCUTANEOUS at 08:01

## 2025-01-16 RX ADMIN — MUPIROCIN: 20 OINTMENT TOPICAL at 08:01

## 2025-01-16 RX ADMIN — MUPIROCIN: 20 OINTMENT TOPICAL at 09:01

## 2025-01-16 NOTE — PROGRESS NOTES
Patient is sitting up in bedside chair this morning on exam.  Patient reports increased cough overnight.  Reports cough is of white sputum.  Nursing staff also reports patient has spiked fever of 103 this morning.  Fever was treated with Tylenol.  Patient also reports he bleeding from his nose this morning.  He relates this to the oxygen tubing and nasal cannula he has been using.  He denies shortness a breath.  He still reports pain and left side of his chest when he takes a deep breath or coughs.  He is using the incentive spirometer at bedside.  Still complains of right knee pain.  Knee immobilizer is not been placed on patient.  MRI results: 1. Complete tear of ACL with sprain of posterolateral corner sprain.2. Grade 1 sprain of MCL.3. Displaced radial tear of posterior horn lateral meniscus.4. Vertical longitudinal tear posterior horn medial meniscus.5. Mildly depressed osteochondral injuries of anterior weight-bearing lateral femoral condyle and posteromedial tibial plateau.  Subchondral fracture of posterolateral tibial plateau. Dr Bashir made aware of Mri results. Knee immobilizer was placed by myself to right leg. Tolerated well by patient.  We will obtain chest x-ray to rule out pneumonia.  We will start IV antibiotics.  We will continue to monitor patient's progress.

## 2025-01-16 NOTE — ASSESSMENT & PLAN NOTE
Patient has a diagnosis of pneumonia. The cause of the pneumonia is suspected to be bacterial in etiology but organism is not known. The pneumonia is stable. The patient has the following signs/symptoms of pneumonia: persistent hypoxia , cough, and sputum production. The patient does not have a current oxygen requirement and the patient does not have a home oxygen requirement. I have reviewed the pertinent imaging. The following cultures have been collected: Sputum culture The culture results are listed below.     Current antimicrobial regimen consists of the antibiotics listed below. Will monitor patient closely and continue current treatment plan unchanged.    Antibiotics (From admission, onward)      Start     Stop Route Frequency Ordered    01/16/25 1100  piperacillin-tazobactam (ZOSYN) 4.5 g in D5W 100 mL IVPB (MB+)         -- IV Every 8 hours (non-standard times) 01/16/25 0952    01/14/25 0900  mupirocin 2 % ointment         01/19/25 0859 Nasl 2 times daily 01/13/25 2201            Microbiology Results (last 7 days)       Procedure Component Value Units Date/Time    Urine culture [[id number]] Collected: 01/12/25 2005    Order Status: Completed Specimen: Urine Updated: 01/14/25 0654     Culture, Urine No Growth

## 2025-01-16 NOTE — SUBJECTIVE & OBJECTIVE
Interval history:    Review of Systems   Constitutional:  Negative for activity change, chills, fatigue and fever.   HENT:  Negative for congestion and sore throat.    Respiratory:  Negative for chest tightness.    Gastrointestinal:  Positive for abdominal pain. Negative for abdominal distention and diarrhea.   Endocrine: Negative for cold intolerance and heat intolerance.   Genitourinary:  Negative for dysuria.   Musculoskeletal:  Negative for arthralgias and back pain.        Shoulder pain     Skin:  Negative for color change and rash.   Neurological:  Negative for dizziness, tremors and headaches.   Psychiatric/Behavioral:  Negative for agitation. The patient is not nervous/anxious.      Objective:     Vital Signs (Most Recent):  Temp: 98.8 °F (37.1 °C) (01/16/25 1244)  Pulse: (!) 117 (01/16/25 1244)  Resp: 18 (01/16/25 1244)  BP: 114/68 (01/16/25 1244)  SpO2: (!) 83 % (01/16/25 1244) Vital Signs (24h Range):  Temp:  [97.9 °F (36.6 °C)-103 °F (39.4 °C)] 98.8 °F (37.1 °C)  Pulse:  [115-123] 117  Resp:  [18-20] 18  SpO2:  [83 %-98 %] 83 %  BP: ()/(55-73) 114/68     Weight: 132.5 kg (292 lb)  Body mass index is 41.9 kg/m².     Physical Exam  Vitals and nursing note reviewed.   Constitutional:       Appearance: Normal appearance.   HENT:      Head: Normocephalic.      Right Ear: Tympanic membrane normal.      Nose: Nose normal.   Cardiovascular:      Rate and Rhythm: Regular rhythm. Tachycardia present.      Pulses: Normal pulses.      Heart sounds: Normal heart sounds.   Pulmonary:      Effort: Pulmonary effort is normal.      Breath sounds: Normal breath sounds.   Chest:      Chest wall: Tenderness present.   Abdominal:      General: Abdomen is flat. Bowel sounds are normal.      Palpations: Abdomen is soft.      Tenderness: There is abdominal tenderness.   Musculoskeletal:         General: No swelling.      Cervical back: Normal range of motion.      Right lower leg: No edema.      Left lower leg: No edema.    Skin:     General: Skin is warm and dry.   Neurological:      General: No focal deficit present.      Mental Status: He is alert and oriented to person, place, and time.   Psychiatric:         Mood and Affect: Mood normal.          Significant Labs: All pertinent labs within the past 24 hours have been reviewed.  BMP:   Recent Labs   Lab 01/16/25 0527   GLU 95   *   K 4.1   CL 98   CO2 27   BUN 9   CREATININE 0.66*   CALCIUM 8.4   MG 2.2     CBC:   Recent Labs   Lab 01/15/25  0453 01/16/25 0527   WBC 16.29* 15.03*   HGB 9.8* 9.4*   HCT 29.4* 27.9*    256     CMP:   Recent Labs   Lab 01/14/25  2358 01/15/25  0452 01/16/25  0527   * 133* 133*   K 4.3 4.2 4.1   CL 99 99 98   CO2 28 26 27   * 112* 95   BUN 8* 8* 9   CREATININE 0.74 0.73 0.66*   CALCIUM 8.2* 7.8* 8.4   PROT  --  5.6* 6.4   ALBUMIN  --  2.8* 2.6*   BILITOT  --  1.4 1.3   ALKPHOS  --  47 60   AST  --  42* 48*   ALT  --  43 41   ANIONGAP 9 12 12       Significant Imaging: I have reviewed all pertinent imaging results/findings within the past 24 hours.

## 2025-01-16 NOTE — PT/OT/SLP PROGRESS
Occupational Therapy      Patient Name:  Best Arguelles   MRN:  [id number]    Patient not seen today secondary to Other (Comment) (awaiting ortho consult). Will follow-up 1/17/25.    1/16/2025
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
## 2025-01-16 NOTE — PT/OT/SLP PROGRESS
Physical Therapy      Patient Name:  Best Arguelles   MRN:  [id number]    Patient not seen today secondary to Other (Comment).Patient with multiple findings on MRI taken yesterday. Secured chat sent to Dr. Bashir to await new weight bearing and activity orders.  Will follow-up when cleared by ortho.

## 2025-01-16 NOTE — PLAN OF CARE
Problem: Adult Inpatient Plan of Care  Goal: Plan of Care Review  Outcome: Progressing  Goal: Patient-Specific Goal (Individualized)  Outcome: Progressing  Goal: Absence of Hospital-Acquired Illness or Injury  Outcome: Progressing  Goal: Optimal Comfort and Wellbeing  Outcome: Progressing  Goal: Readiness for Transition of Care  Outcome: Progressing     Problem: Infection  Goal: Absence of Infection Signs and Symptoms  Outcome: Progressing     Problem: Skin Injury Risk Increased  Goal: Skin Health and Integrity  Outcome: Progressing     Problem: Bariatric Environmental Safety  Goal: Safety Maintained with Care  Outcome: Progressing     Problem: Fall Injury Risk  Goal: Absence of Fall and Fall-Related Injury  Outcome: Progressing     Problem: Gas Exchange Impaired  Goal: Optimal Gas Exchange  Outcome: Progressing     Problem: Pneumonia  Goal: Fluid Balance  Outcome: Progressing  Goal: Resolution of Infection Signs and Symptoms  Outcome: Progressing  Goal: Effective Oxygenation and Ventilation  Outcome: Progressing
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
## 2025-01-17 PROBLEM — S89.91XA: Status: ACTIVE | Noted: 2025-01-17

## 2025-01-17 PROBLEM — S22.42XA MULTIPLE CLOSED FRACTURES OF RIBS OF LEFT SIDE: Status: ACTIVE | Noted: 2025-01-17

## 2025-01-17 LAB
ALBUMIN SERPL BCP-MCNC: 2.6 G/DL (ref 3.5–5)
ALBUMIN/GLOB SERPL: 0.7 {RATIO}
ALP SERPL-CCNC: 67 U/L (ref 40–150)
ALT SERPL W P-5'-P-CCNC: 42 U/L
ANION GAP SERPL CALCULATED.3IONS-SCNC: 14 MMOL/L (ref 7–16)
AST SERPL W P-5'-P-CCNC: 47 U/L (ref 5–34)
BASOPHILS # BLD AUTO: 0.05 K/UL (ref 0–0.2)
BASOPHILS NFR BLD AUTO: 0.4 % (ref 0–1)
BILIRUB SERPL-MCNC: 1.7 MG/DL
BUN SERPL-MCNC: 10 MG/DL (ref 9–21)
BUN/CREAT SERPL: 15 (ref 6–20)
CALCIUM SERPL-MCNC: 8.6 MG/DL (ref 8.4–10.2)
CHLORIDE SERPL-SCNC: 101 MMOL/L (ref 98–107)
CO2 SERPL-SCNC: 24 MMOL/L (ref 22–29)
CREAT SERPL-MCNC: 0.67 MG/DL (ref 0.72–1.25)
DIFFERENTIAL METHOD BLD: ABNORMAL
EGFR (NO RACE VARIABLE) (RUSH/TITUS): 129 ML/MIN/1.73M2
EOSINOPHIL # BLD AUTO: 0.29 K/UL (ref 0–0.5)
EOSINOPHIL NFR BLD AUTO: 2.1 % (ref 1–4)
ERYTHROCYTE [DISTWIDTH] IN BLOOD BY AUTOMATED COUNT: 12.7 % (ref 11.5–14.5)
GLOBULIN SER-MCNC: 3.9 G/DL (ref 2–4)
GLUCOSE SERPL-MCNC: 94 MG/DL (ref 74–100)
HCT VFR BLD AUTO: 30.3 % (ref 40–54)
HGB BLD-MCNC: 10 G/DL (ref 13.5–18)
IMM GRANULOCYTES # BLD AUTO: 0.15 K/UL (ref 0–0.04)
IMM GRANULOCYTES NFR BLD: 1.1 % (ref 0–0.4)
LYMPHOCYTES # BLD AUTO: 1.3 K/UL (ref 1–4.8)
LYMPHOCYTES NFR BLD AUTO: 9.3 % (ref 27–41)
MAGNESIUM SERPL-MCNC: 2.3 MG/DL (ref 1.6–2.6)
MCH RBC QN AUTO: 30.8 PG (ref 27–31)
MCHC RBC AUTO-ENTMCNC: 33 G/DL (ref 32–36)
MCV RBC AUTO: 93.2 FL (ref 80–96)
MONOCYTES # BLD AUTO: 2.27 K/UL (ref 0–0.8)
MONOCYTES NFR BLD AUTO: 16.3 % (ref 2–6)
MPC BLD CALC-MCNC: 9 FL (ref 9.4–12.4)
NEUTROPHILS # BLD AUTO: 9.89 K/UL (ref 1.8–7.7)
NEUTROPHILS NFR BLD AUTO: 70.8 % (ref 53–65)
NRBC # BLD AUTO: 0.03 X10E3/UL
NRBC, AUTO (.00): 0.2 %
PHOSPHATE SERPL-MCNC: 3.1 MG/DL (ref 2.3–4.7)
PLATELET # BLD AUTO: 330 K/UL (ref 150–400)
POTASSIUM SERPL-SCNC: 3.9 MMOL/L (ref 3.5–5.1)
PROT SERPL-MCNC: 6.5 G/DL (ref 6.4–8.3)
RBC # BLD AUTO: 3.25 M/UL (ref 4.6–6.2)
SODIUM SERPL-SCNC: 135 MMOL/L (ref 136–145)
WBC # BLD AUTO: 13.95 K/UL (ref 4.5–11)

## 2025-01-17 PROCEDURE — 25000003 PHARM REV CODE 250: Performed by: SURGERY

## 2025-01-17 PROCEDURE — 84100 ASSAY OF PHOSPHORUS: CPT | Performed by: HOSPITALIST

## 2025-01-17 PROCEDURE — 27000221 HC OXYGEN, UP TO 24 HOURS

## 2025-01-17 PROCEDURE — 63600175 PHARM REV CODE 636 W HCPCS: Performed by: NURSE PRACTITIONER

## 2025-01-17 PROCEDURE — 36415 COLL VENOUS BLD VENIPUNCTURE: CPT | Performed by: HOSPITALIST

## 2025-01-17 PROCEDURE — 94761 N-INVAS EAR/PLS OXIMETRY MLT: CPT

## 2025-01-17 PROCEDURE — 63600175 PHARM REV CODE 636 W HCPCS: Mod: TB | Performed by: SURGERY

## 2025-01-17 PROCEDURE — 25000003 PHARM REV CODE 250: Performed by: NURSE PRACTITIONER

## 2025-01-17 PROCEDURE — 97530 THERAPEUTIC ACTIVITIES: CPT

## 2025-01-17 PROCEDURE — 11000001 HC ACUTE MED/SURG PRIVATE ROOM

## 2025-01-17 PROCEDURE — 83735 ASSAY OF MAGNESIUM: CPT | Performed by: HOSPITALIST

## 2025-01-17 PROCEDURE — 99900035 HC TECH TIME PER 15 MIN (STAT)

## 2025-01-17 PROCEDURE — 25000003 PHARM REV CODE 250: Performed by: HOSPITALIST

## 2025-01-17 PROCEDURE — 85025 COMPLETE CBC W/AUTO DIFF WBC: CPT | Performed by: HOSPITALIST

## 2025-01-17 PROCEDURE — 80053 COMPREHEN METABOLIC PANEL: CPT | Performed by: HOSPITALIST

## 2025-01-17 RX ORDER — HYDROCODONE BITARTRATE AND ACETAMINOPHEN 10; 325 MG/1; MG/1
1 TABLET ORAL EVERY 6 HOURS PRN
Status: DISCONTINUED | OUTPATIENT
Start: 2025-01-17 | End: 2025-01-17

## 2025-01-17 RX ORDER — IBUPROFEN 600 MG/1
600 TABLET ORAL EVERY 6 HOURS PRN
Status: DISCONTINUED | OUTPATIENT
Start: 2025-01-17 | End: 2025-01-18 | Stop reason: HOSPADM

## 2025-01-17 RX ORDER — CEFDINIR 300 MG/1
300 CAPSULE ORAL 2 TIMES DAILY
Qty: 20 CAPSULE | Refills: 0 | Status: SHIPPED | OUTPATIENT
Start: 2025-01-17 | End: 2025-01-27

## 2025-01-17 RX ORDER — ONDANSETRON 4 MG/1
4 TABLET, FILM COATED ORAL EVERY 6 HOURS
Qty: 12 TABLET | Refills: 0 | Status: SHIPPED | OUTPATIENT
Start: 2025-01-17

## 2025-01-17 RX ORDER — HYDROMORPHONE HYDROCHLORIDE 2 MG/1
4 TABLET ORAL EVERY 4 HOURS PRN
Status: DISCONTINUED | OUTPATIENT
Start: 2025-01-17 | End: 2025-01-18 | Stop reason: HOSPADM

## 2025-01-17 RX ORDER — OXYCODONE AND ACETAMINOPHEN 10; 325 MG/1; MG/1
1 TABLET ORAL EVERY 6 HOURS PRN
Qty: 20 TABLET | Refills: 0 | Status: SHIPPED | OUTPATIENT
Start: 2025-01-17 | End: 2025-01-31

## 2025-01-17 RX ORDER — KETOROLAC TROMETHAMINE 30 MG/ML
15 INJECTION, SOLUTION INTRAMUSCULAR; INTRAVENOUS EVERY 6 HOURS PRN
Status: DISCONTINUED | OUTPATIENT
Start: 2025-01-17 | End: 2025-01-17

## 2025-01-17 RX ADMIN — PIPERACILLIN SODIUM AND TAZOBACTAM SODIUM 4.5 G: 4; .5 INJECTION, POWDER, LYOPHILIZED, FOR SOLUTION INTRAVENOUS at 02:01

## 2025-01-17 RX ADMIN — HYDROMORPHONE HYDROCHLORIDE 1 MG: 2 INJECTION, SOLUTION INTRAMUSCULAR; INTRAVENOUS; SUBCUTANEOUS at 06:01

## 2025-01-17 RX ADMIN — IBUPROFEN 600 MG: 600 TABLET ORAL at 06:01

## 2025-01-17 RX ADMIN — HYDROMORPHONE HYDROCHLORIDE 4 MG: 2 TABLET ORAL at 10:01

## 2025-01-17 RX ADMIN — MUPIROCIN: 20 OINTMENT TOPICAL at 09:01

## 2025-01-17 RX ADMIN — PIPERACILLIN SODIUM AND TAZOBACTAM SODIUM 4.5 G: 4; .5 INJECTION, POWDER, LYOPHILIZED, FOR SOLUTION INTRAVENOUS at 06:01

## 2025-01-17 RX ADMIN — MUPIROCIN: 20 OINTMENT TOPICAL at 10:01

## 2025-01-17 RX ADMIN — HYDROMORPHONE HYDROCHLORIDE 1 MG: 2 INJECTION, SOLUTION INTRAMUSCULAR; INTRAVENOUS; SUBCUTANEOUS at 02:01

## 2025-01-17 RX ADMIN — HYDROCODONE BITARTRATE AND ACETAMINOPHEN 1 TABLET: 7.5; 325 TABLET ORAL at 05:01

## 2025-01-17 RX ADMIN — PIPERACILLIN SODIUM AND TAZOBACTAM SODIUM 4.5 G: 4; .5 INJECTION, POWDER, LYOPHILIZED, FOR SOLUTION INTRAVENOUS at 12:01

## 2025-01-17 RX ADMIN — HYDROMORPHONE HYDROCHLORIDE 4 MG: 2 TABLET ORAL at 06:01

## 2025-01-17 RX ADMIN — HYDROMORPHONE HYDROCHLORIDE 1 MG: 2 INJECTION, SOLUTION INTRAMUSCULAR; INTRAVENOUS; SUBCUTANEOUS at 12:01

## 2025-01-17 RX ADMIN — HYDROCODONE BITARTRATE AND ACETAMINOPHEN 1 TABLET: 7.5; 325 TABLET ORAL at 03:01

## 2025-01-17 RX ADMIN — HYDROMORPHONE HYDROCHLORIDE 1 MG: 2 INJECTION, SOLUTION INTRAMUSCULAR; INTRAVENOUS; SUBCUTANEOUS at 09:01

## 2025-01-17 NOTE — PLAN OF CARE
Patient to d/c home tomorrow with wife. Obtaining wheelchair for patient for home use through the medical store. No further needs.
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
## 2025-01-17 NOTE — ASSESSMENT & PLAN NOTE
Monitor serial H&H, monitor pain.  No acute indication for surgery at this time.  01/17/2025 patient will be discharged home today.

## 2025-01-17 NOTE — PLAN OF CARE
Problem: Adult Inpatient Plan of Care  Goal: Plan of Care Review  Outcome: Progressing  Goal: Patient-Specific Goal (Individualized)  Outcome: Progressing  Goal: Absence of Hospital-Acquired Illness or Injury  Outcome: Progressing  Goal: Optimal Comfort and Wellbeing  Outcome: Progressing  Goal: Readiness for Transition of Care  Outcome: Progressing     Problem: Infection  Goal: Absence of Infection Signs and Symptoms  Outcome: Progressing     Problem: Skin Injury Risk Increased  Goal: Skin Health and Integrity  Outcome: Progressing     Problem: Bariatric Environmental Safety  Goal: Safety Maintained with Care  Outcome: Progressing     Problem: Fall Injury Risk  Goal: Absence of Fall and Fall-Related Injury  Outcome: Progressing     Problem: Pneumonia  Goal: Fluid Balance  Outcome: Progressing  Goal: Resolution of Infection Signs and Symptoms  Outcome: Progressing  Goal: Effective Oxygenation and Ventilation  Outcome: Progressing
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
## 2025-01-17 NOTE — DISCHARGE INSTRUCTIONS
Wear knee immobilizer and use crutches for nonweightbearing until follow up with Dr. Bashir in orthopedic clinic.  You will need to call that clinic to schedule appointment.  Wear sling to left shoulder/arm for comfort.  Take medications as prescribed.  You may shower and bathe normally.  Continue insulin a spirometry at home.  You will be prescribed antibiotics to continue at home.  Cough and deep breathe to keep your lungs clear and expanded.

## 2025-01-17 NOTE — PROGRESS NOTES
Patient is awake and alert this morning lying in bed.  Knee immobilizers in place to right knee.  Patient complains of left shoulder pain.  He states his right knee does not hurt unless he moves it.  Fever has resolved.  Patient denies worsening of cough.  States he notes some improvement overnight.  Chest x-ray showed continue pleural effusion on the left and minimal on right.  Patient is clear for discharge from general surgery standpoint.  We will await recommendations from Dr. Bashir regarding right knee injury.

## 2025-01-17 NOTE — SUBJECTIVE & OBJECTIVE
Interval history:    Review of Systems   Constitutional:  Negative for activity change, chills, fatigue and fever.   HENT:  Negative for congestion and sore throat.    Respiratory:  Negative for chest tightness.    Gastrointestinal:  Positive for abdominal pain. Negative for abdominal distention and diarrhea.   Endocrine: Negative for cold intolerance and heat intolerance.   Genitourinary:  Negative for dysuria.   Musculoskeletal:  Negative for arthralgias and back pain.        Shoulder pain     Skin:  Negative for color change and rash.   Neurological:  Negative for dizziness, tremors and headaches.   Psychiatric/Behavioral:  Negative for agitation. The patient is not nervous/anxious.      Objective:     Vital Signs (Most Recent):  Temp: 97.9 °F (36.6 °C) (01/17/25 1139)  Pulse: (!) 115 (01/17/25 1139)  Resp: 18 (01/17/25 1234)  BP: (!) 142/93 (01/17/25 1139)  SpO2: 95 % (01/17/25 1139) Vital Signs (24h Range):  Temp:  [97.9 °F (36.6 °C)-99 °F (37.2 °C)] 97.9 °F (36.6 °C)  Pulse:  [108-127] 115  Resp:  [16-20] 18  SpO2:  [82 %-97 %] 95 %  BP: (109-142)/(59-93) 142/93     Weight: 132.5 kg (292 lb)  Body mass index is 41.9 kg/m².     Physical Exam  Vitals and nursing note reviewed.   Constitutional:       Appearance: Normal appearance.   HENT:      Head: Normocephalic.      Right Ear: Tympanic membrane normal.      Nose: Nose normal.   Cardiovascular:      Rate and Rhythm: Regular rhythm. Tachycardia present.      Pulses: Normal pulses.      Heart sounds: Normal heart sounds.   Pulmonary:      Effort: Pulmonary effort is normal.      Breath sounds: Normal breath sounds.   Chest:      Chest wall: Tenderness present.   Abdominal:      General: Abdomen is flat. Bowel sounds are normal.      Palpations: Abdomen is soft.      Tenderness: There is abdominal tenderness.   Musculoskeletal:         General: No swelling.      Cervical back: Normal range of motion.      Right lower leg: No edema.      Left lower leg: No edema.    Skin:     General: Skin is warm and dry.   Neurological:      General: No focal deficit present.      Mental Status: He is alert and oriented to person, place, and time.   Psychiatric:         Mood and Affect: Mood normal.          Significant Labs: All pertinent labs within the past 24 hours have been reviewed.  BMP:   Recent Labs   Lab 01/17/25 0417   GLU 94   *   K 3.9      CO2 24   BUN 10   CREATININE 0.67*   CALCIUM 8.6   MG 2.3     CBC:   Recent Labs   Lab 01/16/25 0527 01/17/25 0417   WBC 15.03* 13.95*   HGB 9.4* 10.0*   HCT 27.9* 30.3*    330     CMP:   Recent Labs   Lab 01/16/25 0527 01/17/25 0417   * 135*   K 4.1 3.9   CL 98 101   CO2 27 24   GLU 95 94   BUN 9 10   CREATININE 0.66* 0.67*   CALCIUM 8.4 8.6   PROT 6.4 6.5   ALBUMIN 2.6* 2.6*   BILITOT 1.3 1.7*   ALKPHOS 60 67   AST 48* 47*   ALT 41 42   ANIONGAP 12 14       Significant Imaging: I have reviewed all pertinent imaging results/findings within the past 24 hours.

## 2025-01-17 NOTE — PT/OT/SLP PROGRESS
Physical Therapy      Patient Name:  Best Arguelles   MRN:  [id number]    Patient not seen today secondary to Other (Comment) (Pt planning to discharge home but does not have safe environment at this time.). Pt is nonweightbearing to RLE and is unable to use crutches due to left clavicle fracture. He will need to use wheelchair. He is able to transfer to chair with assistance. Will follow-up 1/18/25.

## 2025-01-17 NOTE — PT/OT/SLP PROGRESS
Occupational Therapy   Treatment    Name: Best Arguelles  MRN: [id number]  Admitting Diagnosis:  Injury of spleen with hematoma       Recommendations:     Discharge Recommendations: Low Intensity Therapy  Discharge Equipment Recommendations:  other (see comments) (to be determined)  Barriers to discharge:  None    Assessment:     Best Arguelles is a 29 y.o. male with a medical diagnosis of Injury of spleen with hematoma.  He presents with left clavicle fracture. Performance deficits affecting function are weakness, impaired endurance, impaired self care skills, impaired functional mobility, gait instability, impaired balance, pain, orthopedic precautions.     Rehab Prognosis:  Good; patient would benefit from acute skilled OT services to address these deficits and reach maximum level of function.       Plan:     Patient to be seen 5 x/week to address the above listed problems via self-care/home management, therapeutic activities, therapeutic exercises  Plan of Care Expires: 02/12/25  Plan of Care Reviewed with: patient    Subjective     Chief Complaint: left clavicle fracture  Patient/Family Comments/goals: pt agreeable to OT tx  Pain/Comfort:  Pain Rating 1: 6/10  Location - Side 1: Right  Location 1: knee  Pain Addressed 1: Pre-medicate for activity    Objective:     Communicated with: SDIDHARTHA Hines prior to session.  Patient found up in chair with oxygen, peripheral IV, telemetry upon OT entry to room.    General Precautions: Standard, fall    Orthopedic Precautions:LUE non weight bearing  Braces: UE Sling  Respiratory Status: Room air     Occupational Performance:     Bed Mobility:    Patient completed Sit to Supine with minimum assistance     Functional Mobility/Transfers:  Patient completed Sit <> Stand Transfer with contact guard assistance  with  hand-held assist   Patient completed Bed <> Chair Transfer using Stand Pivot technique with contact guard assistance with hand-held assist  Functional Mobility: not  performed    Activities of Daily Living:  Not performed      [facility] 6 Click ADL:      Treatment & Education:  Pt performed ADL t/f trainign as listed above. Pt with TTWB to RLE.     Patient left HOB elevated with all lines intact, call button in reach, and SIDDHARTHA Hodge notified    GOALS:   Multidisciplinary Problems       Occupational Therapy Goals          Problem: Occupational Therapy    Goal Priority Disciplines Outcome Interventions   Occupational Therapy Goal     OT, PT/OT Progressing    Description: ST.Pt will perform bathing with Kike with setup at EOB  2.Pt will perform UE dressing with Kike  3.Pt will perform LE dressing with Kike  4.Pt will transfer bed/chair/bsc with CGA with AD if needed  5.Pt will perform standing task x 2 min with CGA with AD  6.Tolerate 15 min of tx without fatigue.      LTG:   Restore to max I with selfcare and mobility.                           DME Justifications:  No DME recommended requiring DME justifications    Time Tracking:     OT Date of Treatment: 25  OT Start Time: 0900  OT Stop Time: 917  OT Total Time (min): 17 min    Billable Minutes:Therapeutic Activity 15 minutes    OT/ESTHER: OT          2025

## 2025-01-18 VITALS
WEIGHT: 292 LBS | HEART RATE: 93 BPM | DIASTOLIC BLOOD PRESSURE: 68 MMHG | RESPIRATION RATE: 18 BRPM | BODY MASS INDEX: 41.8 KG/M2 | HEIGHT: 70 IN | TEMPERATURE: 98 F | SYSTOLIC BLOOD PRESSURE: 118 MMHG | OXYGEN SATURATION: 93 %

## 2025-01-18 LAB
ALBUMIN SERPL BCP-MCNC: 2.7 G/DL (ref 3.5–5)
ALBUMIN/GLOB SERPL: 0.6 {RATIO}
ALP SERPL-CCNC: 90 U/L (ref 40–150)
ALT SERPL W P-5'-P-CCNC: 54 U/L
ANION GAP SERPL CALCULATED.3IONS-SCNC: 12 MMOL/L (ref 7–16)
AST SERPL W P-5'-P-CCNC: 56 U/L (ref 5–34)
BASOPHILS # BLD AUTO: 0.07 K/UL (ref 0–0.2)
BASOPHILS NFR BLD AUTO: 0.5 % (ref 0–1)
BILIRUB SERPL-MCNC: 2 MG/DL
BUN SERPL-MCNC: 10 MG/DL (ref 9–21)
BUN/CREAT SERPL: 17 (ref 6–20)
CALCIUM SERPL-MCNC: 9 MG/DL (ref 8.4–10.2)
CHLORIDE SERPL-SCNC: 103 MMOL/L (ref 98–107)
CO2 SERPL-SCNC: 22 MMOL/L (ref 22–29)
CREAT SERPL-MCNC: 0.6 MG/DL (ref 0.72–1.25)
DIFFERENTIAL METHOD BLD: ABNORMAL
EGFR (NO RACE VARIABLE) (RUSH/TITUS): 133 ML/MIN/1.73M2
EOSINOPHIL # BLD AUTO: 0.37 K/UL (ref 0–0.5)
EOSINOPHIL NFR BLD AUTO: 2.9 % (ref 1–4)
ERYTHROCYTE [DISTWIDTH] IN BLOOD BY AUTOMATED COUNT: 12.8 % (ref 11.5–14.5)
GLOBULIN SER-MCNC: 4.3 G/DL (ref 2–4)
GLUCOSE SERPL-MCNC: 105 MG/DL (ref 74–100)
HCT VFR BLD AUTO: 31.4 % (ref 40–54)
HGB BLD-MCNC: 10.1 G/DL (ref 13.5–18)
IMM GRANULOCYTES # BLD AUTO: 0.26 K/UL (ref 0–0.04)
IMM GRANULOCYTES NFR BLD: 2 % (ref 0–0.4)
LYMPHOCYTES # BLD AUTO: 1.13 K/UL (ref 1–4.8)
LYMPHOCYTES NFR BLD AUTO: 8.8 % (ref 27–41)
MAGNESIUM SERPL-MCNC: 2.4 MG/DL (ref 1.6–2.6)
MCH RBC QN AUTO: 30.2 PG (ref 27–31)
MCHC RBC AUTO-ENTMCNC: 32.2 G/DL (ref 32–36)
MCV RBC AUTO: 94 FL (ref 80–96)
MONOCYTES # BLD AUTO: 2.01 K/UL (ref 0–0.8)
MONOCYTES NFR BLD AUTO: 15.6 % (ref 2–6)
MPC BLD CALC-MCNC: 9.2 FL (ref 9.4–12.4)
NEUTROPHILS # BLD AUTO: 9.01 K/UL (ref 1.8–7.7)
NEUTROPHILS NFR BLD AUTO: 70.2 % (ref 53–65)
NRBC # BLD AUTO: 0.04 X10E3/UL
NRBC, AUTO (.00): 0.3 %
PHOSPHATE SERPL-MCNC: 3.7 MG/DL (ref 2.3–4.7)
PLATELET # BLD AUTO: 332 K/UL (ref 150–400)
POTASSIUM SERPL-SCNC: 4.1 MMOL/L (ref 3.5–5.1)
PROT SERPL-MCNC: 7 G/DL (ref 6.4–8.3)
RBC # BLD AUTO: 3.34 M/UL (ref 4.6–6.2)
SODIUM SERPL-SCNC: 133 MMOL/L (ref 136–145)
WBC # BLD AUTO: 12.85 K/UL (ref 4.5–11)

## 2025-01-18 PROCEDURE — 99223 1ST HOSP IP/OBS HIGH 75: CPT | Mod: ,,, | Performed by: FAMILY MEDICINE

## 2025-01-18 PROCEDURE — 25000003 PHARM REV CODE 250: Performed by: NURSE PRACTITIONER

## 2025-01-18 PROCEDURE — 36415 COLL VENOUS BLD VENIPUNCTURE: CPT | Performed by: HOSPITALIST

## 2025-01-18 PROCEDURE — 83735 ASSAY OF MAGNESIUM: CPT | Performed by: HOSPITALIST

## 2025-01-18 PROCEDURE — 85025 COMPLETE CBC W/AUTO DIFF WBC: CPT | Performed by: HOSPITALIST

## 2025-01-18 PROCEDURE — 80053 COMPREHEN METABOLIC PANEL: CPT | Performed by: HOSPITALIST

## 2025-01-18 PROCEDURE — 63600175 PHARM REV CODE 636 W HCPCS: Performed by: NURSE PRACTITIONER

## 2025-01-18 PROCEDURE — 25000003 PHARM REV CODE 250: Performed by: SURGERY

## 2025-01-18 PROCEDURE — 97530 THERAPEUTIC ACTIVITIES: CPT

## 2025-01-18 PROCEDURE — 84100 ASSAY OF PHOSPHORUS: CPT | Performed by: HOSPITALIST

## 2025-01-18 RX ORDER — HYDROMORPHONE HYDROCHLORIDE 4 MG/1
4 TABLET ORAL EVERY 6 HOURS PRN
Qty: 25 TABLET | Refills: 0 | Status: SHIPPED | OUTPATIENT
Start: 2025-01-18

## 2025-01-18 RX ORDER — HYDROMORPHONE HYDROCHLORIDE 4 MG/1
2 TABLET ORAL EVERY 4 HOURS PRN
Qty: 20 TABLET | Refills: 0 | Status: SHIPPED | OUTPATIENT
Start: 2025-01-18

## 2025-01-18 RX ADMIN — MUPIROCIN: 20 OINTMENT TOPICAL at 09:01

## 2025-01-18 RX ADMIN — IBUPROFEN 600 MG: 600 TABLET ORAL at 09:01

## 2025-01-18 RX ADMIN — IBUPROFEN 600 MG: 600 TABLET ORAL at 02:01

## 2025-01-18 RX ADMIN — HYDROMORPHONE HYDROCHLORIDE 4 MG: 2 TABLET ORAL at 02:01

## 2025-01-18 RX ADMIN — HYDROMORPHONE HYDROCHLORIDE 4 MG: 2 TABLET ORAL at 05:01

## 2025-01-18 RX ADMIN — PIPERACILLIN SODIUM AND TAZOBACTAM SODIUM 4.5 G: 4; .5 INJECTION, POWDER, LYOPHILIZED, FOR SOLUTION INTRAVENOUS at 02:01

## 2025-01-18 RX ADMIN — HYDROMORPHONE HYDROCHLORIDE 4 MG: 2 TABLET ORAL at 09:01

## 2025-01-18 NOTE — PLAN OF CARE
Problem: Adult Inpatient Plan of Care  Goal: Plan of Care Review  Outcome: Met  Goal: Patient-Specific Goal (Individualized)  Outcome: Met  Goal: Absence of Hospital-Acquired Illness or Injury  Outcome: Met  Goal: Optimal Comfort and Wellbeing  Outcome: Met  Goal: Readiness for Transition of Care  Outcome: Met     Problem: Infection  Goal: Absence of Infection Signs and Symptoms  Outcome: Met     Problem: Skin Injury Risk Increased  Goal: Skin Health and Integrity  Outcome: Met     Problem: Bariatric Environmental Safety  Goal: Safety Maintained with Care  Outcome: Met     Problem: Fall Injury Risk  Goal: Absence of Fall and Fall-Related Injury  Outcome: Met     Problem: Gas Exchange Impaired  Goal: Optimal Gas Exchange  Outcome: Met     Problem: Pneumonia  Goal: Fluid Balance  Outcome: Met  Goal: Resolution of Infection Signs and Symptoms  Outcome: Met  Goal: Effective Oxygenation and Ventilation  Outcome: Met

## 2025-01-18 NOTE — PT/OT/SLP PROGRESS
Physical Therapy Treatment    Patient Name:  Best Arguelles   MRN:  [id number]    Recommendations:     Discharge Recommendations: Low Intensity Therapy  Discharge Equipment Recommendations: crutches, axillary, wheelchair  Barriers to discharge: Inaccessible home    Assessment:     Best Arguelles is a 29 y.o. male admitted with a medical diagnosis of Injury of spleen with hematoma.  He presents with the following impairments/functional limitations: weakness, impaired endurance, impaired functional mobility, gait instability, pain. Per wife, they have replaced an insufficient ramp with 4 very short steps. Pt states that he can have friends pull him up steps backwards in w/c if he is unable to negotiate with assistance from family and crutch under R arm. Pt ed on proper fit, donning and doffing of RLE immobilizer, as well as ensuring maintenance of NWB RLE until seen by ortho. Pt verbalized understanding, but was standing with RLE PWB at best when therapist entered room.     Rehab Prognosis: Good and Fair; patient would benefit from acute skilled PT services to address these deficits and reach maximum level of function.    Recent Surgery: * No surgery found *      Plan:     During this hospitalization, patient to be seen daily to address the identified rehab impairments via gait training, therapeutic exercises, therapeutic activities and progress toward the following goals:    Plan of Care Expires:  02/14/25    Subjective     Chief Complaint: injury of spleen with hematoma  Patient/Family Comments/goals: agreeable   Pain/Comfort:  Pain Rating 1: 6/10  Location - Side 1: Right  Location 1: knee  Pain Addressed 1: Pre-medicate for activity, Reposition, Distraction, Nurse notified  Pain Rating Post-Intervention 1: 6/10      Objective:     Communicated with nursing prior to session.  Patient found ambulatory in room/gonzalez with peripheral IV, telemetry upon PT entry to room.     General Precautions: Standard, fall  Orthopedic  Precautions: LUE non weight bearing, RLE non weight bearing  Braces: Knee immobilizer, UE Sling  Respiratory Status: Room air     Functional Mobility:  Transfers:     Sit to Stand:  contact guard assistance and minimum assistance with UE support  Bed to Chair: minimum assistance with  armrests of chair  using  Stand Pivot  Balance: Fair in stance       AM-PAC 6 CLICK MOBILITY  Turning over in bed (including adjusting bedclothes, sheets and blankets)?: 3  Sitting down on and standing up from a chair with arms (e.g., wheelchair, bedside commode, etc.): 3  Moving from lying on back to sitting on the side of the bed?: 3  Moving to and from a bed to a chair (including a wheelchair)?: 3  Need to walk in hospital room?: 3  Climbing 3-5 steps with a railing?: 1  Basic Mobility Total Score: 16       Treatment & Education:  Mobility as stated above. Pt and wife ed in use of equipment including wheelchair, axillary crutch, immobilizer and UE sling. Pt and wife verbalized and demonstrated understanding.     Patient left up in chair with all lines intact, call button in reach, and wife notified..    GOALS:   Multidisciplinary Problems       Physical Therapy Goals          Problem: Physical Therapy    Goal Priority Disciplines Outcome Interventions   Physical Therapy Goal     PT, PT/OT Progressing    Description: Short Term Goals  Independent with HEP  Independent with ambulation x 100 feet  Independent supine to sit    Long term goals  Needed equipment for home.                            DME Justifications:  Best Arguelles has a mobility limitation that significantly impairs her ability to participate in one or more mobility related activities of daily living (MRADLs) such as toileting, feeding, dressing, grooming, and bathing in customary locations in the home.  The mobility limitation cannot be sufficiently resolved by the use of a cane or walker.   The use of a manual wheelchair will significantly improve the patients  ability to participate in MRADLS and the patient will use it on regular basis in the home.  Best Arguelles has expressed her willingness to use a manual wheelchair in the home. Patients upper body strength is sufficient for propulsion.  He/She also has a caregiver who is available, willing, and able to provide assistance with the wheelchair when needed.      Time Tracking:     PT Received On: 01/18/25  PT Start Time: 1020     PT Stop Time: 1114  PT Total Time (min): 54 min     Billable Minutes: Therapeutic Activity 30    Treatment Type: Treatment  PT/PTA: PT     Number of PTA visits since last PT visit: 0     01/18/2025
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
## 2025-01-28 ENCOUNTER — OFFICE VISIT (OUTPATIENT)
Dept: FAMILY MEDICINE | Facility: CLINIC | Age: 30
End: 2025-01-28
Payer: COMMERCIAL

## 2025-01-28 VITALS
SYSTOLIC BLOOD PRESSURE: 126 MMHG | TEMPERATURE: 98 F | OXYGEN SATURATION: 96 % | HEART RATE: 97 BPM | DIASTOLIC BLOOD PRESSURE: 80 MMHG | BODY MASS INDEX: 41.9 KG/M2 | HEIGHT: 70 IN | RESPIRATION RATE: 18 BRPM

## 2025-01-28 DIAGNOSIS — E87.1 HYPONATREMIA: ICD-10-CM

## 2025-01-28 DIAGNOSIS — S89.91XA ACUTE INJURY OF ANTERIOR CRUCIATE LIGAMENT OF RIGHT KNEE, INITIAL ENCOUNTER: ICD-10-CM

## 2025-01-28 DIAGNOSIS — S42.025A CLOSED NONDISPLACED FRACTURE OF SHAFT OF LEFT CLAVICLE, INITIAL ENCOUNTER: ICD-10-CM

## 2025-01-28 DIAGNOSIS — S27.321A CONTUSION OF LEFT LUNG, INITIAL ENCOUNTER: ICD-10-CM
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
## 2025-01-28 DIAGNOSIS — S22.42XA CLOSED FRACTURE OF MULTIPLE RIBS OF LEFT SIDE, INITIAL ENCOUNTER: ICD-10-CM

## 2025-01-28 DIAGNOSIS — S36.029A SPLEEN HEMATOMA, INITIAL ENCOUNTER: ICD-10-CM

## 2025-01-28 DIAGNOSIS — D72.829 LEUKOCYTOSIS, UNSPECIFIED TYPE: ICD-10-CM

## 2025-01-28 LAB
ALBUMIN SERPL BCP-MCNC: 3.2 G/DL (ref 3.5–5)
ALBUMIN/GLOB SERPL: 0.7 {RATIO}
ALP SERPL-CCNC: 226 U/L (ref 40–150)
ALT SERPL W P-5'-P-CCNC: 73 U/L
ANION GAP SERPL CALCULATED.3IONS-SCNC: 15 MMOL/L (ref 7–16)
AST SERPL W P-5'-P-CCNC: 47 U/L (ref 5–34)
BASOPHILS # BLD AUTO: 0.06 K/UL (ref 0–0.2)
BASOPHILS NFR BLD AUTO: 0.5 % (ref 0–1)
BILIRUB SERPL-MCNC: 0.5 MG/DL
BUN SERPL-MCNC: 13 MG/DL (ref 9–21)
BUN/CREAT SERPL: 16 (ref 6–20)
CALCIUM SERPL-MCNC: 9.1 MG/DL (ref 8.4–10.2)
CHLORIDE SERPL-SCNC: 103 MMOL/L (ref 98–107)
CO2 SERPL-SCNC: 23 MMOL/L (ref 22–29)
CREAT SERPL-MCNC: 0.8 MG/DL (ref 0.72–1.25)
DIFFERENTIAL METHOD BLD: ABNORMAL
EGFR (NO RACE VARIABLE) (RUSH/TITUS): 122 ML/MIN/1.73M2
EOSINOPHIL # BLD AUTO: 0.15 K/UL (ref 0–0.5)
EOSINOPHIL NFR BLD AUTO: 1.4 % (ref 1–4)
ERYTHROCYTE [DISTWIDTH] IN BLOOD BY AUTOMATED COUNT: 12.6 % (ref 11.5–14.5)
GLOBULIN SER-MCNC: 4.4 G/DL (ref 2–4)
GLUCOSE SERPL-MCNC: 95 MG/DL (ref 74–100)
HCT VFR BLD AUTO: 35.5 % (ref 40–54)
HGB BLD-MCNC: 11.1 G/DL (ref 13.5–18)
IMM GRANULOCYTES # BLD AUTO: 0.15 K/UL (ref 0–0.04)
IMM GRANULOCYTES NFR BLD: 1.4 % (ref 0–0.4)
LYMPHOCYTES # BLD AUTO: 1.31 K/UL (ref 1–4.8)
LYMPHOCYTES NFR BLD AUTO: 12 % (ref 27–41)
MCH RBC QN AUTO: 29.7 PG (ref 27–31)
MCHC RBC AUTO-ENTMCNC: 31.3 G/DL (ref 32–36)
MCV RBC AUTO: 94.9 FL (ref 80–96)
MONOCYTES # BLD AUTO: 1.13 K/UL (ref 0–0.8)
MONOCYTES NFR BLD AUTO: 10.3 % (ref 2–6)
MPC BLD CALC-MCNC: 8 FL (ref 9.4–12.4)
NEUTROPHILS # BLD AUTO: 8.13 K/UL (ref 1.8–7.7)
NEUTROPHILS NFR BLD AUTO: 74.4 % (ref 53–65)
NRBC # BLD AUTO: 0 X10E3/UL
NRBC, AUTO (.00): 0 %
PLATELET # BLD AUTO: 899 K/UL (ref 150–400)
POTASSIUM SERPL-SCNC: 5.7 MMOL/L (ref 3.5–5.1)
PROT SERPL-MCNC: 7.6 G/DL (ref 6.4–8.3)
RBC # BLD AUTO: 3.74 M/UL (ref 4.6–6.2)
SODIUM SERPL-SCNC: 135 MMOL/L (ref 136–145)
WBC # BLD AUTO: 10.93 K/UL (ref 4.5–11)

## 2025-01-28 PROCEDURE — 99213 OFFICE O/P EST LOW 20 MIN: CPT | Mod: GE,,, | Performed by: FAMILY MEDICINE

## 2025-01-28 PROCEDURE — 80053 COMPREHEN METABOLIC PANEL: CPT | Mod: ,,, | Performed by: CLINICAL MEDICAL LABORATORY

## 2025-01-28 PROCEDURE — 1160F RVW MEDS BY RX/DR IN RCRD: CPT | Mod: ,,, | Performed by: FAMILY MEDICINE

## 2025-01-28 PROCEDURE — 1111F DSCHRG MED/CURRENT MED MERGE: CPT | Mod: ,,, | Performed by: FAMILY MEDICINE

## 2025-01-28 PROCEDURE — 3074F SYST BP LT 130 MM HG: CPT | Mod: ,,, | Performed by: FAMILY MEDICINE

## 2025-01-28 PROCEDURE — 3008F BODY MASS INDEX DOCD: CPT | Mod: ,,, | Performed by: FAMILY MEDICINE

## 2025-01-28 PROCEDURE — 1159F MED LIST DOCD IN RCRD: CPT | Mod: ,,, | Performed by: FAMILY MEDICINE

## 2025-01-28 PROCEDURE — 3079F DIAST BP 80-89 MM HG: CPT | Mod: ,,, | Performed by: FAMILY MEDICINE

## 2025-01-28 PROCEDURE — 3044F HG A1C LEVEL LT 7.0%: CPT | Mod: ,,, | Performed by: FAMILY MEDICINE

## 2025-01-28 PROCEDURE — 85025 COMPLETE CBC W/AUTO DIFF WBC: CPT | Mod: ,,, | Performed by: CLINICAL MEDICAL LABORATORY
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
## 2025-01-29 ENCOUNTER — TELEPHONE (OUTPATIENT)
Dept: SURGERY | Facility: CLINIC | Age: 30
End: 2025-01-29
Payer: COMMERCIAL

## 2025-01-30 ENCOUNTER — TELEPHONE (OUTPATIENT)
Dept: ORTHOPEDICS | Facility: CLINIC | Age: 30
End: 2025-01-30
Payer: COMMERCIAL

## 2025-01-30 NOTE — TELEPHONE ENCOUNTER
----- Message from Glenis sent at 1/30/2025  4:29 PM CST -----  Regarding: talk to the nurse  Who Called: Best Arguelles    Caller is requesting assistance/information from provider's office.    oxyCODONE-acetaminophen (PERCOCET)  mg per tablet        Take 1 every 6 hours   30 days  local    Patient was given percocet for pain and said he is almost of them and would like another refill due to the pain    [facility] [address] [place] [id number]  Phone: [phone] Fax: [phone]        Preferred Method of Contact: Phone Call  Patient's Preferred Phone Number on File: [phone]

## 2025-01-30 NOTE — TELEPHONE ENCOUNTER
Informed patient I would let our nurse practitioner know. Nurse Practitioner Tere Clifford notified via secure chat. Dr. Dwayne Bashir office contacted talked to Nisha and she stated she would relay the message to Dr. Bashir's MA. Denies further questions at this time.

## 2025-01-31 ENCOUNTER — TELEPHONE (OUTPATIENT)
Dept: SURGERY | Facility: CLINIC | Age: 30
End: 2025-01-31
Payer: COMMERCIAL

## 2025-01-31 ENCOUNTER — HOSPITAL ENCOUNTER (EMERGENCY)
Facility: HOSPITAL | Age: 30
Discharge: HOME | End: 2025-01-31
Payer: COMMERCIAL

## 2025-01-31 VITALS
OXYGEN SATURATION: 97 % | DIASTOLIC BLOOD PRESSURE: 70 MMHG | HEIGHT: 70 IN | BODY MASS INDEX: 35.79 KG/M2 | HEART RATE: 107 BPM | TEMPERATURE: 99 F | WEIGHT: 250 LBS | RESPIRATION RATE: 19 BRPM | SYSTOLIC BLOOD PRESSURE: 115 MMHG

## 2025-01-31 DIAGNOSIS — S42.025A CLOSED NONDISPLACED FRACTURE OF SHAFT OF LEFT CLAVICLE, INITIAL ENCOUNTER: ICD-10-CM

## 2025-01-31 DIAGNOSIS — S36.029D SPLEEN HEMATOMA, SUBSEQUENT ENCOUNTER: ICD-10-CM

## 2025-01-31 DIAGNOSIS — J18.9 PNEUMONIA OF LEFT LOWER LOBE DUE TO INFECTIOUS ORGANISM: ICD-10-CM

## 2025-01-31 DIAGNOSIS — S27.321A CONTUSION OF LEFT LUNG, INITIAL ENCOUNTER: ICD-10-CM

## 2025-01-31 DIAGNOSIS — Z76.0 ENCOUNTER FOR MEDICATION REFILL: Primary | ICD-10-CM

## 2025-01-31 PROCEDURE — 99283 EMERGENCY DEPT VISIT LOW MDM: CPT

## 2025-01-31 RX ORDER — OXYCODONE AND ACETAMINOPHEN 10; 325 MG/1; MG/1
1 TABLET ORAL EVERY 6 HOURS PRN
Qty: 20 TABLET | Refills: 0 | Status: ON HOLD | OUTPATIENT
Start: 2025-01-31 | End: 2025-02-05 | Stop reason: HOSPADM

## 2025-01-31 NOTE — TELEPHONE ENCOUNTER
----- Message from Susan sent at 1/29/2025 12:54 PM CST -----  Regarding: Refill Request  Who Called: Best Arguelles    Refill or New Rx:Refill  RX Name and Strength:oxyCODONE-acetaminophen (PERCOCET)  mg per tablet  How is the patient currently taking it? (ex. 1XDay):as needed  Is this a 30 day or 90 day RX:30  List of preferred pharmacies on file (remove unneeded): [unfilled] [facility]  Located in: [facility]  Address: [address] [place] [address], [place] [address]  Hours: Open Closes 7 PM  Phone: [phone]  Ordering Provider:Tere Clifford    Preferred Method of Contact: Phone Call  Patient's Preferred Phone Number on File: [phone]

## 2025-01-31 NOTE — TELEPHONE ENCOUNTER
----- Message from Susan sent at 1/29/2025  4:26 PM CST -----  Regarding: Following up on Refill  Who Called: Best Arguelles    Following up on message from earlier about the refill. He said he will be out tomorrow    Preferred Method of Contact: Phone Call  Patient's Preferred Phone Number on File: [phone]
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
## 2025-02-01 NOTE — DISCHARGE INSTRUCTIONS
Follow up with Dr. Bashir as discussed with him. Miralax, Prune juice for bowel movements as discussed. Return to ED if any new or worsening of symptoms occur.

## 2025-02-03 ENCOUNTER — ANESTHESIA EVENT (OUTPATIENT)
Dept: SURGERY | Facility: HOSPITAL | Age: 30
End: 2025-02-03
Payer: COMMERCIAL

## 2025-02-04 ENCOUNTER — OFFICE VISIT (OUTPATIENT)
Dept: SURGERY | Facility: CLINIC | Age: 30
End: 2025-02-04
Attending: SURGERY
Payer: COMMERCIAL

## 2025-02-04 VITALS — BODY MASS INDEX: 35.87 KG/M2 | HEIGHT: 70 IN
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
## 2025-02-04 PROCEDURE — 99212 OFFICE O/P EST SF 10 MIN: CPT | Mod: S$GLB,,, | Performed by: SURGERY

## 2025-02-04 PROCEDURE — 3044F HG A1C LEVEL LT 7.0%: CPT | Mod: S$GLB,,, | Performed by: SURGERY

## 2025-02-04 PROCEDURE — 3008F BODY MASS INDEX DOCD: CPT | Mod: S$GLB,,, | Performed by: SURGERY

## 2025-02-04 PROCEDURE — 99999 PR PBB SHADOW E&M-EST. PATIENT-LVL III: CPT | Mod: PBBFAC,,, | Performed by: SURGERY

## 2025-02-04 PROCEDURE — 1111F DSCHRG MED/CURRENT MED MERGE: CPT | Mod: S$GLB,,, | Performed by: SURGERY

## 2025-02-04 PROCEDURE — 1159F MED LIST DOCD IN RCRD: CPT | Mod: S$GLB,,, | Performed by: SURGERY
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
## 2025-02-05 ENCOUNTER — HOSPITAL ENCOUNTER (OUTPATIENT)
Facility: HOSPITAL | Age: 30
Discharge: HOME | End: 2025-02-05
Attending: ORTHOPAEDIC SURGERY | Admitting: ORTHOPAEDIC SURGERY
Payer: COMMERCIAL

## 2025-02-05 ENCOUNTER — ANESTHESIA (OUTPATIENT)
Dept: SURGERY | Facility: HOSPITAL | Age: 30
End: 2025-02-05
Payer: COMMERCIAL

## 2025-02-05 VITALS
DIASTOLIC BLOOD PRESSURE: 74 MMHG | RESPIRATION RATE: 12 BRPM | HEIGHT: 70 IN | TEMPERATURE: 99 F | SYSTOLIC BLOOD PRESSURE: 118 MMHG | WEIGHT: 255 LBS | OXYGEN SATURATION: 93 % | BODY MASS INDEX: 36.51 KG/M2 | HEART RATE: 83 BPM

## 2025-02-05 DIAGNOSIS — S83.511A RUPTURE OF ANTERIOR CRUCIATE LIGAMENT OF RIGHT KNEE, INITIAL ENCOUNTER: Primary | ICD-10-CM

## 2025-02-05 PROBLEM — S83.519A ACL TEAR: Status: ACTIVE | Noted: 2025-02-05

## 2025-02-05 PROCEDURE — 36000711: Performed by: ORTHOPAEDIC SURGERY

## 2025-02-05 PROCEDURE — 27000655: Performed by: ANESTHESIOLOGY

## 2025-02-05 PROCEDURE — 71000016 HC POSTOP RECOV ADDL HR: Performed by: ORTHOPAEDIC SURGERY

## 2025-02-05 PROCEDURE — 25000003 PHARM REV CODE 250: Performed by: ANESTHESIOLOGY

## 2025-02-05 PROCEDURE — 97161 PT EVAL LOW COMPLEX 20 MIN: CPT

## 2025-02-05 PROCEDURE — 63600175 PHARM REV CODE 636 W HCPCS: Performed by: NURSE ANESTHETIST, CERTIFIED REGISTERED

## 2025-02-05 PROCEDURE — 37000008 HC ANESTHESIA 1ST 15 MINUTES: Performed by: ORTHOPAEDIC SURGERY

## 2025-02-05 PROCEDURE — 27000716 HC OXISENSOR PROBE, ANY SIZE: Performed by: ANESTHESIOLOGY

## 2025-02-05 PROCEDURE — 63600175 PHARM REV CODE 636 W HCPCS: Performed by: ORTHOPAEDIC SURGERY

## 2025-02-05 PROCEDURE — D9220A PRA ANESTHESIA: Mod: CRNA,,, | Performed by: NURSE ANESTHETIST, CERTIFIED REGISTERED

## 2025-02-05 PROCEDURE — 27201423 OPTIME MED/SURG SUP & DEVICES STERILE SUPPLY: Performed by: ORTHOPAEDIC SURGERY

## 2025-02-05 PROCEDURE — 64447 NJX AA&/STRD FEMORAL NRV IMG: CPT | Mod: 59,RT,, | Performed by: ANESTHESIOLOGY

## 2025-02-05 PROCEDURE — 36000710: Performed by: ORTHOPAEDIC SURGERY

## 2025-02-05 PROCEDURE — 63600175 PHARM REV CODE 636 W HCPCS: Performed by: ANESTHESIOLOGY

## 2025-02-05 PROCEDURE — 27000165 HC TUBE, ETT CUFFED: Performed by: ANESTHESIOLOGY

## 2025-02-05 PROCEDURE — 27000689 HC BLADE LARYNGOSCOPE ANY SIZE: Performed by: ANESTHESIOLOGY

## 2025-02-05 PROCEDURE — 63600175 PHARM REV CODE 636 W HCPCS: Mod: TB,JZ | Performed by: ANESTHESIOLOGY

## 2025-02-05 PROCEDURE — D9220A PRA ANESTHESIA: Mod: ANES,,, | Performed by: ANESTHESIOLOGY

## 2025-02-05 PROCEDURE — 71000015 HC POSTOP RECOV 1ST HR: Performed by: ORTHOPAEDIC SURGERY

## 2025-02-05 PROCEDURE — 25000003 PHARM REV CODE 250: Performed by: NURSE ANESTHETIST, CERTIFIED REGISTERED

## 2025-02-05 PROCEDURE — C1713 ANCHOR/SCREW BN/BN,TIS/BN: HCPCS | Performed by: ORTHOPAEDIC SURGERY

## 2025-02-05 PROCEDURE — 37000009 HC ANESTHESIA EA ADD 15 MINS: Performed by: ORTHOPAEDIC SURGERY

## 2025-02-05 PROCEDURE — 71000033 HC RECOVERY, INTIAL HOUR: Performed by: ORTHOPAEDIC SURGERY

## 2025-02-05 DEVICE — IMPLANTABLE DEVICE
Type: IMPLANTABLE DEVICE | Site: KNEE | Status: FUNCTIONAL
Brand: FIBERSTITCH™ IMPLANT 1.5, CURVED WITH TWO POLYESTER IMPLANTS AND 2-0 FIBERWIRE®

## 2025-02-05 DEVICE — FIBERTAG TIGHTROPE II ABS
Type: IMPLANTABLE DEVICE | Site: KNEE | Status: FUNCTIONAL
Brand: ARTHREX®

## 2025-02-05 DEVICE — IMPLSYS 2NDRY FIXATN BIOSWVLK 4.75X19.1
Type: IMPLANTABLE DEVICE | Site: KNEE | Status: FUNCTIONAL
Brand: ARTHREX®

## 2025-02-05 DEVICE — IMPL SYS, AUTOGRAFT GRAFTLINK CP 2
Type: IMPLANTABLE DEVICE | Site: KNEE | Status: FUNCTIONAL
Brand: ARTHREX®

## 2025-02-05 DEVICE — FAST-FIX 360 CURVED MENISCAL                                    REPAIR SYSTEM
Type: IMPLANTABLE DEVICE | Site: KNEE | Status: FUNCTIONAL
Brand: FAST-FIX

## 2025-02-05 RX ORDER — HYDROMORPHONE HYDROCHLORIDE 2 MG/ML
0.5 INJECTION, SOLUTION INTRAMUSCULAR; INTRAVENOUS; SUBCUTANEOUS EVERY 5 MIN PRN
Status: DISCONTINUED | OUTPATIENT
Start: 2025-02-05 | End: 2025-02-05 | Stop reason: HOSPADM

## 2025-02-05 RX ORDER — GLUCAGON 1 MG
1 KIT INJECTION
Status: DISCONTINUED | OUTPATIENT
Start: 2025-02-05 | End: 2025-02-05 | Stop reason: HOSPADM

## 2025-02-05 RX ORDER — PROPOFOL 10 MG/ML
VIAL (ML) INTRAVENOUS
Status: DISCONTINUED | OUTPATIENT
Start: 2025-02-05 | End: 2025-02-05

## 2025-02-05 RX ORDER — HYDROMORPHONE HYDROCHLORIDE 2 MG/ML
INJECTION, SOLUTION INTRAMUSCULAR; INTRAVENOUS; SUBCUTANEOUS
Status: DISCONTINUED | OUTPATIENT
Start: 2025-02-05 | End: 2025-02-05

## 2025-02-05 RX ORDER — ONDANSETRON HYDROCHLORIDE 2 MG/ML
4 INJECTION, SOLUTION INTRAVENOUS DAILY PRN
Status: DISCONTINUED | OUTPATIENT
Start: 2025-02-05 | End: 2025-02-05 | Stop reason: HOSPADM

## 2025-02-05 RX ORDER — CELECOXIB 200 MG/1
200 CAPSULE ORAL 2 TIMES DAILY
Qty: 60 CAPSULE | Refills: 2 | Status: SHIPPED | OUTPATIENT
Start: 2025-02-05

## 2025-02-05 RX ORDER — ACETAMINOPHEN 10 MG/ML
1000 INJECTION, SOLUTION INTRAVENOUS ONCE
Status: COMPLETED | OUTPATIENT
Start: 2025-02-05 | End: 2025-02-05

## 2025-02-05 RX ORDER — DEXAMETHASONE SODIUM PHOSPHATE 4 MG/ML
INJECTION, SOLUTION INTRA-ARTICULAR; INTRALESIONAL; INTRAMUSCULAR; INTRAVENOUS; SOFT TISSUE
Status: DISCONTINUED | OUTPATIENT
Start: 2025-02-05 | End: 2025-02-05

## 2025-02-05 RX ORDER — ASPIRIN 81 MG/1
81 TABLET ORAL DAILY
Qty: 30 TABLET | Refills: 0 | Status: SHIPPED | OUTPATIENT
Start: 2025-02-05 | End: 2025-03-07

## 2025-02-05 RX ORDER — DIPHENHYDRAMINE HYDROCHLORIDE 50 MG/ML
25 INJECTION INTRAMUSCULAR; INTRAVENOUS EVERY 6 HOURS PRN
Status: DISCONTINUED | OUTPATIENT
Start: 2025-02-05 | End: 2025-02-05 | Stop reason: HOSPADM

## 2025-02-05 RX ORDER — KETOROLAC TROMETHAMINE 30 MG/ML
30 INJECTION, SOLUTION INTRAMUSCULAR; INTRAVENOUS ONCE
Status: COMPLETED | OUTPATIENT
Start: 2025-02-05 | End: 2025-02-05

## 2025-02-05 RX ORDER — CEFAZOLIN SODIUM 1 G/3ML
INJECTION, POWDER, FOR SOLUTION INTRAMUSCULAR; INTRAVENOUS
Status: DISCONTINUED | OUTPATIENT
Start: 2025-02-05 | End: 2025-02-05

## 2025-02-05 RX ORDER — OXYCODONE AND ACETAMINOPHEN 5; 325 MG/1; MG/1
1 TABLET ORAL EVERY 4 HOURS PRN
Status: DISCONTINUED | OUTPATIENT
Start: 2025-02-05 | End: 2025-02-05 | Stop reason: HOSPADM

## 2025-02-05 RX ORDER — MORPHINE SULFATE 10 MG/ML
4 INJECTION INTRAMUSCULAR; INTRAVENOUS; SUBCUTANEOUS EVERY 5 MIN PRN
Status: DISCONTINUED | OUTPATIENT
Start: 2025-02-05 | End: 2025-02-05 | Stop reason: HOSPADM

## 2025-02-05 RX ORDER — ONDANSETRON 4 MG/1
8 TABLET, ORALLY DISINTEGRATING ORAL EVERY 8 HOURS PRN
Status: DISCONTINUED | OUTPATIENT
Start: 2025-02-05 | End: 2025-02-05 | Stop reason: HOSPADM

## 2025-02-05 RX ORDER — EPINEPHRINE 1 MG/ML
INJECTION INTRAMUSCULAR; INTRAVENOUS; SUBCUTANEOUS
Status: DISCONTINUED | OUTPATIENT
Start: 2025-02-05 | End: 2025-02-05 | Stop reason: HOSPADM

## 2025-02-05 RX ORDER — FENTANYL CITRATE 50 UG/ML
INJECTION, SOLUTION INTRAMUSCULAR; INTRAVENOUS
Status: DISCONTINUED | OUTPATIENT
Start: 2025-02-05 | End: 2025-02-05

## 2025-02-05 RX ORDER — MIDAZOLAM HYDROCHLORIDE 1 MG/ML
INJECTION INTRAMUSCULAR; INTRAVENOUS
Status: DISCONTINUED | OUTPATIENT
Start: 2025-02-05 | End: 2025-02-05

## 2025-02-05 RX ORDER — LIDOCAINE HYDROCHLORIDE 20 MG/ML
INJECTION, SOLUTION EPIDURAL; INFILTRATION; INTRACAUDAL; PERINEURAL
Status: DISCONTINUED | OUTPATIENT
Start: 2025-02-05 | End: 2025-02-05

## 2025-02-05 RX ORDER — TRANEXAMIC ACID 100 MG/ML
INJECTION, SOLUTION INTRAVENOUS
Status: DISCONTINUED | OUTPATIENT
Start: 2025-02-05 | End: 2025-02-05

## 2025-02-05 RX ORDER — DEXMEDETOMIDINE HYDROCHLORIDE 100 UG/ML
INJECTION, SOLUTION INTRAVENOUS
Status: DISCONTINUED | OUTPATIENT
Start: 2025-02-05 | End: 2025-02-05

## 2025-02-05 RX ORDER — OXYCODONE AND ACETAMINOPHEN 10; 325 MG/1; MG/1
1 TABLET ORAL EVERY 4 HOURS PRN
Status: DISCONTINUED | OUTPATIENT
Start: 2025-02-05 | End: 2025-02-05 | Stop reason: HOSPADM

## 2025-02-05 RX ORDER — OXYCODONE HYDROCHLORIDE 5 MG/1
10 TABLET ORAL EVERY 4 HOURS PRN
Status: DISCONTINUED | OUTPATIENT
Start: 2025-02-05 | End: 2025-02-05 | Stop reason: HOSPADM

## 2025-02-05 RX ORDER — ROCURONIUM BROMIDE 10 MG/ML
INJECTION, SOLUTION INTRAVENOUS
Status: DISCONTINUED | OUTPATIENT
Start: 2025-02-05 | End: 2025-02-05

## 2025-02-05 RX ORDER — IPRATROPIUM BROMIDE AND ALBUTEROL SULFATE 2.5; .5 MG/3ML; MG/3ML
3 SOLUTION RESPIRATORY (INHALATION) ONCE AS NEEDED
Status: DISCONTINUED | OUTPATIENT
Start: 2025-02-05 | End: 2025-02-05 | Stop reason: HOSPADM

## 2025-02-05 RX ORDER — OXYCODONE AND ACETAMINOPHEN 10; 325 MG/1; MG/1
1 TABLET ORAL EVERY 6 HOURS PRN
Qty: 30 TABLET | Refills: 0 | Status: SHIPPED | OUTPATIENT
Start: 2025-02-05

## 2025-02-05 RX ORDER — ONDANSETRON HYDROCHLORIDE 2 MG/ML
INJECTION, SOLUTION INTRAVENOUS
Status: DISCONTINUED | OUTPATIENT
Start: 2025-02-05 | End: 2025-02-05

## 2025-02-05 RX ORDER — MUPIROCIN 20 MG/G
OINTMENT TOPICAL 2 TIMES DAILY
Status: DISCONTINUED | OUTPATIENT
Start: 2025-02-05 | End: 2025-02-05 | Stop reason: HOSPADM

## 2025-02-05 RX ORDER — ONDANSETRON 4 MG/1
4 TABLET, ORALLY DISINTEGRATING ORAL EVERY 6 HOURS PRN
Qty: 30 TABLET | Refills: 0 | Status: SHIPPED | OUTPATIENT
Start: 2025-02-05

## 2025-02-05 RX ADMIN — ROPIVACAINE HYDROCHLORIDE 30 ML: 5 INJECTION, SOLUTION EPIDURAL; INFILTRATION; PERINEURAL at 09:02

## 2025-02-05 RX ADMIN — ROCURONIUM BROMIDE 20 MG: 10 INJECTION, SOLUTION INTRAVENOUS at 11:02

## 2025-02-05 RX ADMIN — HYDROMORPHONE HYDROCHLORIDE 2 MG: 2 INJECTION, SOLUTION INTRAMUSCULAR; INTRAVENOUS; SUBCUTANEOUS at 10:02

## 2025-02-05 RX ADMIN — ACETAMINOPHEN 1000 MG: 10 INJECTION, SOLUTION INTRAVENOUS at 02:02

## 2025-02-05 RX ADMIN — SUGAMMADEX 200 MG: 100 INJECTION, SOLUTION INTRAVENOUS at 12:02

## 2025-02-05 RX ADMIN — TRANEXAMIC ACID 1000 MG: 100 INJECTION, SOLUTION INTRAVENOUS at 09:02

## 2025-02-05 RX ADMIN — DEXMEDETOMIDINE HYDROCHLORIDE 8 MCG: 100 INJECTION, SOLUTION, CONCENTRATE INTRAVENOUS at 12:02

## 2025-02-05 RX ADMIN — DEXMEDETOMIDINE HYDROCHLORIDE 2 MCG: 100 INJECTION, SOLUTION, CONCENTRATE INTRAVENOUS at 12:02

## 2025-02-05 RX ADMIN — OXYCODONE AND ACETAMINOPHEN 1 TABLET: 10; 325 TABLET ORAL at 09:02

## 2025-02-05 RX ADMIN — DEXMEDETOMIDINE HYDROCHLORIDE 4 MCG: 100 INJECTION, SOLUTION, CONCENTRATE INTRAVENOUS at 12:02

## 2025-02-05 RX ADMIN — ONDANSETRON 4 MG: 2 INJECTION INTRAMUSCULAR; INTRAVENOUS at 09:02

## 2025-02-05 RX ADMIN — KETOROLAC TROMETHAMINE 30 MG: 30 INJECTION, SOLUTION INTRAMUSCULAR at 02:02

## 2025-02-05 RX ADMIN — ROCURONIUM BROMIDE 50 MG: 10 INJECTION, SOLUTION INTRAVENOUS at 09:02

## 2025-02-05 RX ADMIN — PROPOFOL 150 MG: 10 INJECTION, EMULSION INTRAVENOUS at 09:02

## 2025-02-05 RX ADMIN — LIDOCAINE HYDROCHLORIDE 100 MG: 20 INJECTION, SOLUTION INTRAVENOUS at 09:02

## 2025-02-05 RX ADMIN — HYDROMORPHONE HYDROCHLORIDE 2 MG: 2 INJECTION, SOLUTION INTRAMUSCULAR; INTRAVENOUS; SUBCUTANEOUS at 12:02

## 2025-02-05 RX ADMIN — FENTANYL CITRATE 100 MCG: 50 INJECTION, SOLUTION INTRAMUSCULAR; INTRAVENOUS at 09:02

## 2025-02-05 RX ADMIN — SODIUM CHLORIDE: 9 INJECTION, SOLUTION INTRAVENOUS at 09:02

## 2025-02-05 RX ADMIN — TRANEXAMIC ACID 1000 MG: 100 INJECTION, SOLUTION INTRAVENOUS at 12:02

## 2025-02-05 RX ADMIN — ROCURONIUM BROMIDE 20 MG: 10 INJECTION, SOLUTION INTRAVENOUS at 10:02

## 2025-02-05 RX ADMIN — CEFAZOLIN 2 G: 1 INJECTION, POWDER, FOR SOLUTION INTRAMUSCULAR; INTRAVENOUS; PARENTERAL at 09:02

## 2025-02-05 RX ADMIN — PROPOFOL 50 MG: 10 INJECTION, EMULSION INTRAVENOUS at 09:02

## 2025-02-05 RX ADMIN — VANCOMYCIN HYDROCHLORIDE 1500 MG: 1 INJECTION, POWDER, LYOPHILIZED, FOR SOLUTION INTRAVENOUS at 09:02

## 2025-02-05 RX ADMIN — DEXAMETHASONE SODIUM PHOSPHATE 8 MG: 4 INJECTION, SOLUTION INTRA-ARTICULAR; INTRALESIONAL; INTRAMUSCULAR; INTRAVENOUS; SOFT TISSUE at 09:02

## 2025-02-05 RX ADMIN — MIDAZOLAM HYDROCHLORIDE 2 MG: 1 INJECTION, SOLUTION INTRAMUSCULAR; INTRAVENOUS at 09:02

## 2025-02-05 RX ADMIN — FENTANYL CITRATE 100 MCG: 50 INJECTION, SOLUTION INTRAMUSCULAR; INTRAVENOUS at 10:02

## 2025-02-05 NOTE — PT/OT/SLP EVAL
Physical Therapy Evaluation    Patient Name:  Best Arguelles   MRN:  [id number]    Recommendations:     Discharge Recommendations: Low Intensity Therapy   Discharge Equipment Recommendations: none   Barriers to discharge: None    Assessment:     Best Arguelles is a 29 y.o. male admitted with a medical diagnosis of ACL tear.  He presents with the following impairments/functional limitations:   Patient with good ability to transfer to . Patient with restrictions to left UE due to clavicle fracture so crutches or not an option at this time. Will have to use WC until non weight bearing to left ue is taken off.    Rehab Prognosis: Good; patient would benefit from acute skilled PT services to address these deficits and reach maximum level of function.    Recent Surgery: Procedure(s) (LRB):  RECONSTRUCTION, KNEE, ACL, USING GRAFT (Right)  EXTENSIVE DEBRIDEMENT, ARTHROSCOPY, KNEE (Right)  ARTHROSCOPY,KNEE,WITH MENISCUS REPAIR (Right) Day of Surgery    Plan:     During this hospitalization, patient to be seen 1 x/week to address the identified rehab impairments via gait training, therapeutic activities and progress toward the following goals:    Plan of Care Expires:  02/05/25    Subjective     Chief Complaint: post op pain  Patient/Family Comments/goals: plan is dc home today  Pain/Comfort:  Pain Rating 1: 6/10  Location - Side 1: Right  Location 1: knee  Pain Addressed 1: Cessation of Activity, Pre-medicate for activity    Patients cultural, spiritual, Sikh conflicts given the current situation: no    Living Environment:  Lives with spouse, steps entering home  Prior to admission, patients level of function was independent.  Equipment used at home: wheelchair, crutches.  DME owned (not currently used): none.  Upon discharge, patient will have assistance from spouse.    Objective:     Communicated with nurse prior to session.  Patient found supine with peripheral IV  upon PT entry to room.    General Precautions:  Standard,    Orthopedic Precautions:LUE non weight bearing, RLE non weight bearing   Braces: Hinged knee brace  Respiratory Status: Room air    Exams:  na    Functional Mobility:  Bed Mobility:     Supine to Sit: minimum assistance  Sit to Supine: minimum assistance  Transfers:     Bed to Chair: contact guard assistance with  hand-held assist  using  Stand Pivot      AM-PAC 6 CLICK MOBILITY  Total Score:14       Treatment & Education:  HEP: acl/meniscal repair protocol  Transfer training nwb left UE and right LE to wc  Don/doff brace    Patient left supine with call button in reach.    GOALS:   Multidisciplinary Problems       Physical Therapy Goals       Not on file                    DME Justifications:  No DME recommended requiring DME justifications    History:     History reviewed. No pertinent past medical history.    Past Surgical History:   Procedure Laterality Date    APPENDECTOMY      LAPAROSCOPIC APPENDECTOMY N/A 04/15/2022    Procedure: APPENDECTOMY, LAPAROSCOPIC;  Surgeon: Leandro Clarke MD;  Location: [facility];  Service: General;  Laterality: N/A;       Time Tracking:     PT Received On: 02/05/25  PT Start Time: 1530     PT Stop Time: 1600  PT Total Time (min): 30 min     Billable Minutes: Evaluation 30      02/05/2025
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
## 2025-02-05 NOTE — OR NURSING
1426 REC'D TO PACU IN STABLE CONDITION. VSS. SATS 100% ON 6L/FM. WILL TITRATE O2 ATOL. PAIN RATED 0 ON FACES SCALE AT THIS TIME. ACE WRAP TO R LEG C/D/I w/ IMMOBILIZER NOTE TO R LEG. WILL CONT TO MONITOR.    1350 R KNEE PAIN RATED 10/10 ON PAIN SCALE. RR 9 O2 SAT 91 ON RA. NOTIFIED DR PACHECO. ORDERS REC'D.     1410 UPDATE ON PT STATUS GIVEN TO WIFE VIA TEXT MESSAGE.    1425 RETURNED TO [facility] #[facility] IN STABLE CONDITION. REPORT GIVEN TO ASHA GUERRERO. /67 HR 90 SATS 94% ON RA. WIFE AT BEDSIDE.

## 2025-02-05 NOTE — ANESTHESIA PROCEDURE NOTES
Intubation    Date/Time: 2/5/2025 9:36 AM    Performed by: Darinel Renae CRNA  Authorized by: Markus Hickman MD    Intubation:     Induction:  Intravenous    Intubated:  Postinduction    Mask Ventilation:  Easy with oral airway    Attempts:  1    Attempted By:  CRNA    Method of Intubation:  Direct    Blade:  Alford 2    Laryngeal View Grade: Grade IIA - cords partially seen      Difficult Airway Encountered?: No      Complications:  None    Airway Device:  Oral endotracheal tube    Airway Device Size:  7.5    Style/Cuff Inflation:  Cuffed    Inflation Amount (mL):  10    Tube secured:  22    Secured at:  The lips    Placement Verified By:  Capnometry    Complicating Factors:  None    Findings Post-Intubation:  BS equal bilateral

## 2025-02-05 NOTE — OP NOTE
Surgery Date: 2/5/2025      Surgeon(s) and Role:     * Dwayne Bashir MD - Primary    Assistant: Edgar Fletcher    Pre-op Diagnosis:   ACL tear [S83.519A]     Post-op Diagnosis:  Post-Op Diagnosis Codes:     * ACL tear [S83.519A]   Medial meniscus tear  Complex lateral meniscus tear      Procedure:  Procedure(s) (LRB):  RECONSTRUCTION, KNEE, ACL, USING GRAFT (Right)  EXTENSIVE DEBRIDEMENT, ARTHROSCOPY, KNEE (Right)  ARTHROSCOPY,KNEE,WITH MENISCUS REPAIR (Right) discussed    Anesthesia:  General/Regional     EBL:  10 cc    Implants:   Implant Name Type Inv. Item Serial No.  Lot No. LRB No. Used Action   DEVICE FIX TIGHTROPE FIBERTAG - [id number]  DEVICE FIX TIGHTROPE FIBERTAG  [employer] [id number] Right 1 Implanted   DEVICE FIX TIGHTROPE FIBERTAG - [id number]  DEVICE FIX TIGHTROPE FIBERTAG  [employer] [id number] Right 1 Implanted   SYS AUTOGRAFT GRAFTLINK CP - [id number]  SYS AUTOGRAFT GRAFTLINK CP  [employer] [id number] Right 1 Implanted   SYS NOVOSTITCH PRO MENIS REP 0 - [id number]  SYS NOVOSTITCH PRO MENIS REP 0  [employer] [id number] Right 1 Implanted and Explanted   SYS NOVOSTITCH PRO MENIS 2-0 - [id number]  SYS NOVOSTITCH PRO MENIS 2-0  [employer] [id number] Right 1 Implanted and Explanted   ANCHOR FIBERSTITCH 1.5 CRV - [id number]  ANCHOR FIBERSTITCH 1.5 CRV  [employer] [id number] Right 1 Implanted   FIBERSTITCH IMPLANT 1.5 REVERSE CURVED WITH 2 POLYESTER IMPLANT AND 2-0 FIBERWIRE SUTURE ANATOMICAL LIGATURE   [id number]  [id number] Right 1 Implanted   CARTRIDGE NOVOSTITCH PRO 0 - [id number]  CARTRIDGE NOVOSTITCH PRO 0  [employer] [id number] Right 1 Implanted and Explanted   CARTRIDGE NOVOSTITCH PRO 0 - [id number]  CARTRIDGE NOVOSTITCH PRO 0  [employer] [id number] Right 1 Implanted and Explanted   ANCHOR FIBERSTITCH 1.5 CRV - [id number]  ANCHOR FIBERSTITCH 1.5 CRV  [employer] [id number] Right 1 Implanted and Explanted   SYS NDL DELIVERY FAST  - [id number]  SYS NDL DELIVERY FAST   [employer] [id number]  Right 1 Implanted   SYS NDL DELIVERY FAST  - [id number]  SYS NDL DELIVERY FAST   [employer] [id number] Right 1 Implanted   SYS SWIVELOCK ANCH 4.75X19.1MM - [id number]  SYS SWIVELOCK ANCH 4.75X19.1MM  [employer] [id number] Right 1 Implanted       Tourniquet time: 120 mins    Complication:   none      DISPOSITION: Extubated and taken to the recovery room in stable condition with less than 2 second capillary refills in all digits and compartments were soft.     INDICATIONS:  The patient is a 29 y.o. year-old who had a history and physical examination consistent with the aforementioned diagnoses.  The risks and benefits were discussed with the patient.  The patient acknowledged an understanding and wished to proceed with operative intervention.  Informed consent was obtained prior to the procedure.  Details of the surgical procedure were explained including incisions, probable rehabilitation course and description of the hardware and graft to be used was given.  The patient understands the likely length of convalescence after surgery and we reasonably explained the risks, benefits and alternatives to surgery.  The reasonable expectations and potential complications were discussed and acknowledged including, but not limited to, infection, bleeding, blood clots, nerve injury, retear, instability and continued pain and stiffness.  It was also explained that there was a chance of failure, which may require further management.  The patient agreed, understood and wished to proceed.      EXAMINATION UNDER ANESTHESIA of the OPERATIVE KNEE:    Range of motion from to 0 to 140 degrees, a grade 2 Lachman's test and grade 2 pivot shift test.  The patient was stable to varus and valgus stress at 0 and 30 degrees of flexion.  There was a negative effusion.      EXAMINATION UNDER ANESTHESIA of the NONOPERATIVE KNEE:    Range of motion from 0 to 140 degrees, stable to varus and valgus stress at 0 and 30 degrees of flexion, a  negative Lachman's test, negative pivot shift and a negative effusion.       DESCRIPTION OF OPERATION: After the correct operative site was marked by the operating surgeon and the correct consents were signed, the patient was brought to the Operating Room and placed in the supine position where general anesthetic was administered by the Anesthesia Team.  All pressure points were carefully padded and checked.  The operative lower extremity had a well padded tourniquet. The operative leg was prepped and draped free in the usual sterile fashion.  After prepping and draping, the appropriate landmarks were noted on the skin. A surgical pause was performed and the patient received prophylactic antibiotics. The knee was exsanguinated and the tourniquet was inflated to 250 mmHg.     The inferolateral followed by the inferomedial portals were created and systemic examination of the joint revealed the following:     PF  negative compartmentalization or adhesions in the suprapatellar pouch.   Trochlear chondromalacia:none  Patella chondromalacia: none    Medial  Medial femoral chondyle chondromalacia : none  Medial tibial plateau chondromalacia none  +  for meniscus tear.  This was a tear at the meniscocapsular junction both superiorly and inferiorly.  I prepared the meniscus with a motorized shaver in order to stimulate a healing response.  A fibrous stitch was utilized superiorly and inferiorly in a vertical mattress fashion in order to repair the meniscal capsular junction tear.  Excellent fixation was able to be achieved.    Lateral  Lateral femoral condyle chondromalacia: grade I  Lateral tibial plateau chondromalacia: grade I  +  for meniscus tear.  The posterior horn of the lateral meniscus was flipped inferiorly and there was a radial tear near the popliteal hiatus.  I had to use a probe in order to dislodge the flipped lateral meniscus tear.  This then reduced quite nicely.  I 1st attempted to repair this with a Julio  stitch utilizing a side-to-side technique.  This was tied with modified Anam knot and then I supplemented this with the additional 0 Julio stitch as well which was tied but unfortunately this toward during our ACL prep.  I return to this removing the sutures and utilized an all-inside fast fix 360 device.  1 was placed in a vertical fashion in order to fixate the lateral meniscus posterior horn to the capsule.  Another suture was used in a side-to-side horizontal fashion to connect the meniscus to the meniscus across the radial split.  This resulted in an excellent repair and a reapproximation of the lateral meniscus tear.      This should be noted that we are within the lateral compartment this required extensive release in order to free up the lateral meniscus tissue.  This required careful debridement around the popliteus and debridement of the the scar tissue.  There was also abundant scar tissue seen within the anterior interval both medially and laterally this necessitated an extensive debridement of the anteromedial anterolateral Hoffa's fat pad as well as a mediolateral shelf plica.  This afforded appropriate exposure and range of motion immobility.    Examination of the intercondylar notch revealed a complete tear of the ACL. The PCL was probed and found to be intact.         Attention was turned to quadriceps tendon graft harvesting.  4 cm incision was made in a vertical fashion overlying the quadriceps tendon the incision was carried through skin and subcutaneous tissue.  A 9 mm parallel blade was utilized to 1st create the incision and then a cigar cutter was used to complete this.  This was approximately 60 mm in size.  This was incised on the back table and found to represent an 8 mm diameter graft.  This was prepared in a quad link fashion.  The quad was then repaired utilizing interrupted 2. FiberWire sutures with the knee held in 90° flexion.                                                                   The anatomic footprint of the ACL was noted both on the lateral femoral condyle as well as the tibial plateau.  After marking the center of both, we turned our attention to drilling a femoral tunnel.  The [employer] FlipCutter guide was introduced through the inferolateral portal and centered in the anatomic footprint of the ACL.  We then made a 1 cm incision over the lateral aspect of the thigh and the FlipCutter guide was introduced down to bone.  We then introduced the FlipCutter through the guide.  We drilled the FlipCutter guide to the center of the ACL footprint.  We then flipped it into a reamed position and reamed a tunnel.    Our attention was then turned to the tibial tunnel.  After centering the guide in center of the anatomic footprint, the FlipCutter was introduced into the previously made medial tibia incision.  The guide pin was drilled through the center of the tibial footprint and flipped into a reamed position.  The tibial tunnel was then reamed.     After reaming the tibia, the graft was passed in the standard fashion.  We ensured the button flipped over the lateral cortex of the femur. The graft was drawn into the femoral tunnel stopping 2 mm from the end of the femoral tunnel. We then brought the graft into the tibial tunnel. There was adequate tibial tunnel length and the graft did not bottom out. The knee was brought into full extension. There was no evidence of impingement.     We then secured the graft on the tibial side with a ABS button. We then secured the internal brace, which was secured through the tight rope button over the lateral cortex of the femur. We did this in full extension with a 4.75mm swivel lock distal to the tibial tunnel. The tibial portion of the graft was then tensioned prior to cycling the knee 40 times. The leg was brought into full extension. With a posterior drawer force in place, the graft was re-tensioned on the tibial side and then the femoral side.  The  arthroscope was introduced into the knee.  The graft was probed and found to be taut.     The sutures from the quad link were tied.  The femoral tightrope sutures were tied down to the button.     After the completion of this, the patient had a negative Lachman's test and a negative pivot shift test.    The wounds were copiously irrigated and closed in layers with #2-0 vicryl and #3-0 monocryl.  Portals were closed with 3'0 nylon.  A sterile dressing and hinged knee brace were placed.  The patient was allowed to recover from anesthesia, extubated and taken to the Recovery Room in stable condition with less than 1 second capillary refill in all digits and soft compartments.                                                           ACL Reconstruction with Meniscal Repair                                                   Post-Operative Protocol  Due to the presence of a radial meniscus tear laterally we will remain nonweightbearing for 6 weeks!!!!  Phase I - Maximum Protection (Weeks 0 to 4):    Weeks 0 to 2:  Ice and modalities to reduce pain and inflammation  Elevate the knee above the heart for 3 to 5 days  Use crutches NWB 7-10 days then 50% WB x1week to reduce swelling. The patient may when they can ambulate without a limp.  Brace locked in full extension for 4 to 6 weeks per physician orders  Initiate patella mobility drills  Begin passive/active knee range of motion to 90° of knee flexion and strong emphasis on full knee extension  Quadriceps setting focusing on VMO contraction  Multi-plane open kinetic chain straight leg raising  Gait training    Weeks 2 to 4:  Begin open and closed kinetic chain resisted cord multi-plane hip strengthening as acute inflammation resolves  Proprioception training  Manual PNF hip and ankle patterns  Begin pool program when incision sites healed    Phase II - Progressive Stretching and Early Strengthening (Week 4 to 6):    Weeks 4 to 6:  Gradually restore full range of motion with  emphasis on extension/hyperextension  Continue with ice and modalities as needed  Normalize gait  Open brace to 0° to 90° per physician's orders  Initiate lower extremity stretching program  Begin stationary bike, treadmill, and/or elliptical  as strength and swelling allow  Begin closed kinetic chain strengthening progressing from bilateral to unilateral as tolerated  Implement reintegration exercises emphasizing core stability  Advance closed kinetic chain multi-plane hip strengthening  Proprioceptive drills emphasizing neuromuscular control    Phase III - Advanced Strengthening and Proprioception Phase (Weeks 6 to 12):    Weeks 6 to 10:  Modalities as needed to control swelling  Wean out of brace weeks 6 to 8  Advance time and intensity on cardiovascular program - no running  Begin functional cord program    Weeks 10 to 12:  Initiate gym-strengthening program - Progressing form bilateral to unilateral  Leg press, squats, lunges, hamstring curls, ab/adduction, calf raises, and leg extensions (0° to 30°)  May begin outdoor biking and conservative hiking    Phase IV - Strengthening and Plyometric Phase (Weeks 12 to 20):    Weeks 12 to 20:  Implement a full gym-strengthening program  Begin pool running progressing to dry land as tolerated  Advance proprioception and begin plyometrics progressing from bilateral to unilateral as tolerated    Phase V - Return to Sport Functional Program (Week 20 to 24):    Follow-up examination with physician  Implement sport specific multi-directional drills  Implement interval functional program per physician approval  Continue with aggressive lower extremity stretching, strengthening, and cardiovascular training  Advance plyometric program as tolerated  Sports test for return to play                                                                            Return to Activity      Brace: Wear brace at 0°/0° for 4-6 weeks unless modified with physician approval  Crutches: 7-14  days, wean off as swelling, pain, strength, and function permit  Drivin-2 weeks when FWB and off of pain medications  Showering: Immediately covered, 3-5 days uncovered & not soaking                  Stationary Bike: At 2-4 weeks post-operatively as range of motion and swelling permit    Walking Program: With brace on, beginning when swelling and normal gait permit         ~ 2 weeks    Pool Program: Deep water jogging/cycling ~ 2-3 weeks post-operatively when wounds are healed, and shallow water walking at ~ 6 weeks    Elliptical Trainer: At approximately 6 weeks    Outdoor Biking: No incline/decline in easy gears at 8-10 weeks post-operative.  Progress as tolerated adding incline and decline, and increasing intensity.    Hiking: Easy groomed trails at 8-10 weeks    Swimming: Free-style at 8 weeks, breast stroke and flip turns at 4 months post-operative    Jogging: Initiate jogging in pool at 10-12 weeks, progress to dry land at 12-14 weeks in a straight plane and minimal incline/decline, progressing as tolerated    Sprinting: Begin at 14-16 weeks post-operatively    Downhill Skiing: May begin at weeks 20-24    Golf: Begin with putting at 12 weeks, chipping at 16 weeks, then progress to interval golf program at 20 weeks with sports brace    Lateral Agilities: Begin at 4.5 months post-operatively, progressing over the next 6 weeks. Use sports brace for on-field work    Progressive sports specific drills: Begin at 5 months post-operatively with brace on, progressing over the next 4 weeks. For example:  Football pass patterns  Shooting basketball  Soccer dribbling  Karate forms      Return to full contact/impact/rotation sport: At 6 months with sports brace, sports test, and physician approval.
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
## 2025-02-05 NOTE — TRANSFER OF CARE
"Anesthesia Transfer of Care Note    Patient: Best Arguelles    Procedure(s) Performed: Procedure(s) (LRB):  RECONSTRUCTION, KNEE, ACL, USING GRAFT (Right)  EXTENSIVE DEBRIDEMENT, ARTHROSCOPY, KNEE (Right)  ARTHROSCOPY,KNEE,WITH MENISCUS REPAIR (Right)    Patient location: PACU    Anesthesia Type: general    Transport from OR: Transported from OR on 100% O2 by closed face mask with adequate spontaneous ventilation    Post pain: adequate analgesia    Post assessment: no apparent anesthetic complications    Post vital signs: stable    Level of consciousness: alert, oriented and awake    Nausea/Vomiting: no nausea/vomiting    Complications: none    Transfer of care protocol was followed      Last vitals: Visit Vitals  BP (!) 106/55 (BP Location: Right arm, Patient Position: Lying)   Pulse 95   Temp 37.2 °C (99 °F) (Oral)   Resp (!) 8   Ht 5' 10" (1.778 m)   Wt 115.7 kg (255 lb)   SpO2 95%   BMI 36.59 kg/m²     "

## 2025-02-10 RX ORDER — ROPIVACAINE HYDROCHLORIDE 5 MG/ML
INJECTION, SOLUTION EPIDURAL; INFILTRATION; PERINEURAL
Status: DISCONTINUED | OUTPATIENT
Start: 2025-02-05 | End: 2025-02-10

## 2025-02-10 NOTE — ANESTHESIA POSTPROCEDURE EVALUATION
Anesthesia Post Evaluation    Patient: Best Arguelles    Procedure(s) Performed: Procedure(s) (LRB):  RECONSTRUCTION, KNEE, ACL, USING GRAFT (Right)  EXTENSIVE DEBRIDEMENT, ARTHROSCOPY, KNEE (Right)  ARTHROSCOPY,KNEE,WITH MENISCUS REPAIR (Right)    Final Anesthesia Type: general      Patient location during evaluation: PACU  Patient participation: Yes- Able to Participate  Level of consciousness: awake and alert  Post-procedure vital signs: reviewed and stable  Pain management: adequate  Airway patency: patent  DARRELL mitigation strategies: Multimodal analgesia and Use of major conduction anesthesia (spinal/epidural) or peripheral nerve block  PONV status at discharge: No PONV  Anesthetic complications: no      Cardiovascular status: blood pressure returned to baseline  Respiratory status: unassisted  Hydration status: euvolemic  Follow-up not needed.              Vitals Value Taken Time   /74 02/05/25 1516   Temp 37.2 °C (99 °F) 02/05/25 1330   Pulse 86 02/05/25 1518   Resp 12 02/05/25 1445   SpO2 95 % 02/05/25 1518   Vitals shown include unfiled device data.      Event Time   Out of Recovery 14:20:00         Pain/[place] Score: No data recorded

## 2025-02-10 NOTE — ANESTHESIA PROCEDURE NOTES
Peripheral Block    Patient location during procedure: OR   Block not for primary anesthetic.  Reason for block: at surgeon's request and post-op pain management   Post-op Pain Location: right knee   Start time: 2/5/2025 9:40 AM  Timeout: 2/5/2025 9:40 AM   End time: 2/5/2025 9:41 AM    Staffing  Authorizing Provider: Markus Hickman MD  Performing Provider: Markus Hickman MD    Staffing  Performed by: Markus Hickman MD  Authorized by: Markus Hickman MD    Preanesthetic Checklist  Completed: patient identified, IV checked, site marked, risks and benefits discussed, surgical consent, monitors and equipment checked, pre-op evaluation and timeout performed  Peripheral Block  Patient position: supine  Prep: ChloraPrep  Patient monitoring: heart rate, cardiac monitor, continuous pulse ox, continuous capnometry and frequent blood pressure checks  Block type: adductor canal  Laterality: right  Injection technique: single shot  Needle  Needle type: Stimuplex   Needle gauge: 20 G  Needle length: 4 in  Needle localization: ultrasound guidance   -ultrasound image captured on disc.  Assessment  Injection assessment: negative aspiration, negative parasthesia and local visualized surrounding nerve  Paresthesia pain: none  Heart rate change: no  Slow fractionated injection: yes  Pain Tolerance: comfortable throughout block and no complaints  Medications:    Medications: ROPIvacaine (NAROPIN) injection 0.5% - Perineural   30 mL - 2/5/2025 9:41:00 AM

## 2025-02-10 NOTE — DISCHARGE SUMMARY
[facility] - Orthopedic Periop Services  Discharge Note  Short Stay    Procedure(s) (LRB):  RECONSTRUCTION, KNEE, ACL, USING GRAFT (Right)  EXTENSIVE DEBRIDEMENT, ARTHROSCOPY, KNEE (Right)  ARTHROSCOPY,KNEE,WITH MENISCUS REPAIR (Right)      OUTCOME: Patient tolerated treatment/procedure well without complication and is now ready for discharge.    DISPOSITION: Home or Self Care    FINAL DIAGNOSIS:  ACL tear    FOLLOWUP: In clinic    DISCHARGE INSTRUCTIONS:    Discharge Procedure Orders   Diet general     Remove dressing in 72 hours   Order Comments: Remove dressing in 3 days.  Place band-aids over portal sites.     Call MD for:  temperature >100.4     Call MD for:  persistent nausea and vomiting     Call MD for:  severe uncontrolled pain     Call MD for:  difficulty breathing, headache or visual disturbances     Call MD for:  redness, tenderness, or signs of infection (pain, swelling, redness, odor or green/yellow discharge around incision site)     Call MD for:  hives     Call MD for:  persistent dizziness or light-headedness     Call MD for:  extreme fatigue     Weight bearing restrictions (specify)   Order Comments: Please refer to op note for therapy plan        TIME SPENT ON DISCHARGE: 9 minutes

## 2025-02-13 DIAGNOSIS — Z98.890 S/P RIGHT KNEE SURGERY: Primary | ICD-10-CM

## 2025-02-13 DIAGNOSIS — S83.511D SPRAIN OF ANTERIOR CRUCIATE LIGAMENT OF RIGHT KNEE, SUBSEQUENT ENCOUNTER: ICD-10-CM

## 2025-02-17 ENCOUNTER — CLINICAL SUPPORT (OUTPATIENT)
Dept: REHABILITATION | Facility: HOSPITAL | Age: 30
End: 2025-02-17
Payer: COMMERCIAL

## 2025-02-17 DIAGNOSIS — Z98.890 S/P RIGHT KNEE SURGERY: ICD-10-CM

## 2025-02-17 DIAGNOSIS — M25.661 DECREASED ROM OF RIGHT KNEE: ICD-10-CM

## 2025-02-17 DIAGNOSIS — S83.511D SPRAIN OF ANTERIOR CRUCIATE LIGAMENT OF RIGHT KNEE, SUBSEQUENT ENCOUNTER: ICD-10-CM

## 2025-02-17 DIAGNOSIS — Z74.09 DECREASED FUNCTIONAL MOBILITY AND ENDURANCE: Primary | ICD-10-CM

## 2025-02-17 PROBLEM — S83.511A SPRAIN OF ANTERIOR CRUCIATE LIGAMENT OF RIGHT KNEE: Status: ACTIVE | Noted: 2025-02-17

## 2025-02-17 PROCEDURE — 97110 THERAPEUTIC EXERCISES: CPT | Mod: PN

## 2025-02-17 PROCEDURE — 97161 PT EVAL LOW COMPLEX 20 MIN: CPT | Mod: PN
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
## 2025-02-25 ENCOUNTER — CLINICAL SUPPORT (OUTPATIENT)
Dept: REHABILITATION | Facility: HOSPITAL | Age: 30
End: 2025-02-25
Payer: COMMERCIAL

## 2025-02-25 DIAGNOSIS — Z74.09 DECREASED FUNCTIONAL MOBILITY AND ENDURANCE: ICD-10-CM

## 2025-02-25 DIAGNOSIS — Z98.890 S/P RIGHT KNEE SURGERY: Primary | ICD-10-CM

## 2025-02-25 PROCEDURE — 97112 NEUROMUSCULAR REEDUCATION: CPT | Mod: PN,CQ

## 2025-02-25 NOTE — PROGRESS NOTES
Outpatient Rehab    Physical Therapy Visit    Patient Name: Best Arguelles  MRN: [id number]  YOB: 1995  Encounter Date: 2/25/2025    Therapy Diagnosis:   Encounter Diagnoses   Name Primary?    S/P right knee surgery Yes    Decreased functional mobility and endurance      Physician: Dwayne Bashir MD    Physician Orders: Eval and Treat    Medical Diagnosis: right acl, lateral meniscus repair , medial mensicus   Date of Evaluation:  2/17/2025   Insurance Authorization Period: 2/17/2025  to 12/31/2025   Plan of Care Certification:  2/17/2025 to 4/14/2025   Visit # / Visits Authorized:  2 / unlimited 80$ copay   nwb until 3/19/25  PTA visit# 1     Time In: 245    Time Out:  323  Total Time:     Total Billable Time: 38    DOS 2/5/25    FOTO:  Intake Score:  %  Survey Score 1:  %  Survey Score 2:  %         Subjective   I'm using crutches now, able to lift leg..  Family / care giver present for this visit:          Objective       Knee Range of Motion   Right Knee   Active (deg) Passive (deg) Pain   Flexion 107       Extension -9           Quad lag with terminal knee extension -20           Treatment:  Balance/Neuromuscular Re-Education  Balance/Neuromuscular Re-Education Activity 1: swissball knee flexion x 15  Balance/Neuromuscular Re-Education Activity 2: qs with estim x 20  Balance/Neuromuscular Re-Education Activity 3: tke es x 20  Balance/Neuromuscular Re-Education Activity 4: sl right hip abd and add  es x 10  Balance/Neuromuscular Re-Education Activity 5: static weight loads es x 10    Assessment & Plan   Assessment: pat had iimproved rom in all planes, ambulating  nwb with crutches.  Evaluation/Treatment Tolerance: Patient tolerated treatment well    Patient will continue to benefit from skilled outpatient physical therapy to address the deficits listed in the problem list box on initial evaluation, provide pt/family education and to maximize pt's level of independence in the home and community  environment.     Patient's spiritual, cultural, and educational needs considered and patient agreeable to plan of care and goals.           Plan: Plan of care Certification: 2/17/2025 to 4/14/2025.     Outpatient Physical Therapy 2 times weekly for 8 weeks to include the following interventions: Electrical Stimulation nmes, Manual Therapy, Neuromuscular Re-ed, Patient Education, Therapeutic Activities, and Therapeutic Exercise.     Goals:   Active       long term goals         pt will progress to ambulating  without assistive device         Start:  02/17/25    Expected End:  04/14/25            pt will increase mmt to 4/5        Start:  02/17/25    Expected End:  04/14/25            pt will be ambulate without a limp        Start:  02/17/25    Expected End:  04/14/25            pt will be able to stoop and bend pain free         Start:  02/17/25    Expected End:  04/14/25               short term goals        pt will be non weight bearing x 6 weeks         Start:  02/17/25    Expected End:  03/17/25            pt will progress range of motion 0-110         Start:  02/17/25    Expected End:  03/17/25            pt will decrease quad lag to 0 degrees        Start:  02/17/25    Expected End:  03/17/25            pt will open brace o-90 degrees at 4 weeks        Start:  02/17/25    Expected End:  03/10/25                Adriana Esparza PTA

## 2025-03-11 ENCOUNTER — OFFICE VISIT (OUTPATIENT)
Dept: FAMILY MEDICINE | Facility: CLINIC | Age: 30
End: 2025-03-11
Payer: COMMERCIAL

## 2025-03-11 VITALS
HEIGHT: 70 IN | HEART RATE: 90 BPM | TEMPERATURE: 98 F | WEIGHT: 262 LBS | SYSTOLIC BLOOD PRESSURE: 130 MMHG | OXYGEN SATURATION: 97 % | DIASTOLIC BLOOD PRESSURE: 89 MMHG | BODY MASS INDEX: 37.51 KG/M2 | RESPIRATION RATE: 16 BRPM

## 2025-03-11 DIAGNOSIS — B86 SCABIES: Primary | ICD-10-CM

## 2025-03-11 DIAGNOSIS — M62.838 MUSCLE SPASM: ICD-10-CM

## 2025-03-11 DIAGNOSIS — M25.561 ACUTE PAIN OF RIGHT KNEE: ICD-10-CM

## 2025-03-11 PROCEDURE — 1159F MED LIST DOCD IN RCRD: CPT | Mod: ,,, | Performed by: SPECIALIST

## 2025-03-11 PROCEDURE — 3044F HG A1C LEVEL LT 7.0%: CPT | Mod: ,,, | Performed by: SPECIALIST

## 2025-03-11 PROCEDURE — 3079F DIAST BP 80-89 MM HG: CPT | Mod: ,,, | Performed by: SPECIALIST

## 2025-03-11 PROCEDURE — 1160F RVW MEDS BY RX/DR IN RCRD: CPT | Mod: ,,, | Performed by: SPECIALIST

## 2025-03-11 PROCEDURE — 3008F BODY MASS INDEX DOCD: CPT | Mod: ,,, | Performed by: SPECIALIST

## 2025-03-11 PROCEDURE — 99213 OFFICE O/P EST LOW 20 MIN: CPT | Mod: GC,,, | Performed by: SPECIALIST

## 2025-03-11 PROCEDURE — 3075F SYST BP GE 130 - 139MM HG: CPT | Mod: ,,, | Performed by: SPECIALIST

## 2025-03-11 RX ORDER — DICLOFENAC SODIUM 75 MG/1
75 TABLET, DELAYED RELEASE ORAL 2 TIMES DAILY
Qty: 60 TABLET | Refills: 0 | Status: SHIPPED | OUTPATIENT
Start: 2025-03-11 | End: 2025-04-10

## 2025-03-11 RX ORDER — PERMETHRIN 50 MG/G
CREAM TOPICAL ONCE
Qty: 60 G | Refills: 1 | Status: SHIPPED | OUTPATIENT
Start: 2025-03-11 | End: 2025-03-11

## 2025-03-11 RX ORDER — CETIRIZINE HYDROCHLORIDE 10 MG/1
10 TABLET ORAL DAILY
Qty: 30 TABLET | Refills: 2 | Status: SHIPPED | OUTPATIENT
Start: 2025-03-11 | End: 2025-06-09

## 2025-03-11 RX ORDER — METHOCARBAMOL 500 MG/1
500 TABLET, FILM COATED ORAL EVERY 8 HOURS PRN
Qty: 30 TABLET | Refills: 0 | Status: SHIPPED | OUTPATIENT
Start: 2025-03-11 | End: 2025-03-21

## 2025-03-11 NOTE — PROGRESS NOTES
Subjective:       Patient ID: Best Arguelles is a 29 y.o. male.    Chief Complaint: Follow-up ( 6 WKS F/U/), Insect Bite (Pt stated that is all over his body and the household. ), and Medication Refill (Pt would like refill on medication.)    Patient is an 29 y.o male who presents today with itching noted allover his body. Patient also would like refills on his medication. Patient is 6 weeks post knee surgery. He denies HA, fever, fatigue, N/V/D, shortness of breath or chest pain        Current Medications[1]    Review of patient's allergies indicates:  No Known Allergies    History reviewed. No pertinent past medical history.    Past Surgical History:   Procedure Laterality Date    APPENDECTOMY      ARTHROSCOPY OF KNEE Right 2/5/2025    Procedure: EXTENSIVE DEBRIDEMENT, ARTHROSCOPY, KNEE;  Surgeon: Dwayne Bashir MD;  Location: [facility] OR;  Service: Orthopedics;  Laterality: Right;    ARTHROSCOPY,KNEE,WITH MENISCUS REPAIR Right 2/5/2025    Procedure: ARTHROSCOPY,KNEE,WITH MENISCUS REPAIR;  Surgeon: Dwayne Bashir MD;  Location: [facility] OR;  Service: Orthopedics;  Laterality: Right;    LAPAROSCOPIC APPENDECTOMY N/A 04/15/2022    Procedure: APPENDECTOMY, LAPAROSCOPIC;  Surgeon: Leandro Clarke MD;  Location: [facility] OR;  Service: General;  Laterality: N/A;    RECONSTRUCTION OF ANTERIOR CRUCIATE LIGAMENT USING GRAFT Right 2/5/2025    Procedure: RECONSTRUCTION, KNEE, ACL, USING GRAFT;  Surgeon: Dwayne Bashir MD;  Location: [facility];  Service: Orthopedics;  Laterality: Right;  AUTOGRAFT QUAD TENDON       Family History   Problem Relation Name Age of Onset    Diabetes Father         Social History[2]    Review of Systems   Constitutional: Negative.  Negative for fatigue and fever.   HENT: Negative.  Negative for nasal congestion, ear discharge, ear pain, nosebleeds, sinus pressure/congestion, sneezing and sore throat.    Eyes: Negative.  Negative for pain, discharge and itching.   Respiratory:  Negative.  Negative for cough and shortness of breath.    Cardiovascular: Negative.  Negative for chest pain and leg swelling.   Gastrointestinal: Negative.  Negative for abdominal distention, abdominal pain and anal bleeding.   Endocrine: Negative.  Negative for cold intolerance, heat intolerance and polydipsia.   Genitourinary: Negative.  Negative for bladder incontinence, difficulty urinating, discharge, dysuria, erectile dysfunction, penile pain and penile swelling.   Musculoskeletal: Negative.  Negative for arthralgias, back pain, gait problem, joint swelling, leg pain, myalgias, neck pain, neck stiffness and joint deformity.   Integumentary:  Negative for rash and wound.   Allergic/Immunologic: Negative.  Negative for environmental allergies and food allergies.   Neurological:  Negative for facial asymmetry, speech difficulty, weakness, light-headedness, numbness and headaches.   Hematological: Negative.  Negative for adenopathy.   Psychiatric/Behavioral:  Negative for agitation, behavioral problems, confusion and decreased concentration.    All other systems reviewed and are negative.        Current Medications:   Medication List with Changes/Refills   New Medications    CETIRIZINE (ZYRTEC) 10 MG TABLET    Take 1 tablet (10 mg total) by mouth once daily.       Start Date: 3/11/2025 End Date: 6/9/2025    DICLOFENAC (VOLTAREN) 75 MG EC TABLET    Take 1 tablet (75 mg total) by mouth 2 (two) times daily.       Start Date: 3/11/2025 End Date: 4/10/2025    METHOCARBAMOL (ROBAXIN) 500 MG TAB    Take 1 tablet (500 mg total) by mouth every 8 (eight) hours as needed (Muscle spasm).       Start Date: 3/11/2025 End Date: 3/21/2025    PERMETHRIN (ELIMITE) 5 % CREAM    Apply topically once. for 1 dose       Start Date: 3/11/2025 End Date: 3/11/2025   Current Medications    ASPIRIN (ECOTRIN) 81 MG EC TABLET    Take 1 tablet (81 mg total) by mouth once daily.       Start Date: 2/5/2025  End Date: 3/7/2025     "OXYCODONE-ACETAMINOPHEN (PERCOCET)  MG PER TABLET    Take 1 tablet by mouth every 6 (six) hours as needed for Pain.       Start Date: 2/5/2025  End Date: --   Discontinued Medications    CELECOXIB (CELEBREX) 200 MG CAPSULE    Take 1 capsule (200 mg total) by mouth 2 (two) times daily.       Start Date: 2/5/2025  End Date: 3/11/2025    ONDANSETRON (ZOFRAN-ODT) 4 MG TBDL    Take 1 tablet (4 mg total) by mouth every 6 (six) hours as needed (nausea).       Start Date: 2/5/2025  End Date: 3/11/2025            Objective:        Vitals:    03/11/25 1323   BP: 130/89   Pulse: 90   Resp: 16   Temp: 98.2 °F (36.8 °C)   TempSrc: Oral   SpO2: 97%   Weight: 118.8 kg (262 lb)   Height: 5' 10" (1.778 m)       Physical Exam  Cardiovascular:      Rate and Rhythm: Normal rate and regular rhythm.      Pulses: Normal pulses.      Heart sounds: Normal heart sounds.   Pulmonary:      Effort: Pulmonary effort is normal.      Breath sounds: Normal breath sounds.   Musculoskeletal:      Comments: Right knee brace noted   Skin:     General: Skin is warm and dry.      Comments: Bites noted in webs of hands and on arms   Neurological:      Mental Status: He is alert.               Lab Results   Component Value Date    WBC 10.46 02/04/2025    HGB 10.5 (L) 02/04/2025    HCT 34.4 (L) 02/04/2025     (H) 02/04/2025    CHOL 92 01/15/2025    TRIG 84 01/15/2025    HDL 32 (L) 01/15/2025    ALT 73 (H) 01/28/2025    AST 47 (H) 01/28/2025     (L) 01/28/2025    K 5.7 (H) 01/28/2025     01/28/2025    CREATININE 0.80 01/28/2025    BUN 13 01/28/2025    CO2 23 01/28/2025    HGBA1C 5.1 01/15/2025      Assessment:       1. Scabies    2. Acute pain of right knee    3. Muscle spasm        Plan:         Problem List Items Addressed This Visit          Derm    Scabies - Primary    Patient noted to have scabies on hands and in webs of finger and noted to be spreading through his household  Permethrin cream prescribed  Patient education on " medication  Zyrtec given to help with itching.         Relevant Medications    permethrin (ELIMITE) 5 % cream    cetirizine (ZYRTEC) 10 MG tablet       Orthopedic    Acute pain of right knee    Patient s/p right knee surgery  Will prescribe diclofenac PO  Patient to follow up with orthopedics in 1 weeks         Relevant Medications    diclofenac (VOLTAREN) 75 MG EC tablet    methocarbamoL (ROBAXIN) 500 MG Tab     Other Visit Diagnoses         Muscle spasm        Relevant Medications    methocarbamoL (ROBAXIN) 500 MG Tab              Follow up in about 2 months (around 5/11/2025), or Follow up.    Thierry Nair MD     Instructed patient that if symptoms fail to improve or worsen patient should seek immediate medical attention or report to the nearest emergency department. Patient expressed verbal agreement and understanding to this plan of care.           [1]   Current Outpatient Medications:     oxyCODONE-acetaminophen (PERCOCET)  mg per tablet, Take 1 tablet by mouth every 6 (six) hours as needed for Pain., Disp: 30 tablet, Rfl: 0    aspirin (ECOTRIN) 81 MG EC tablet, Take 1 tablet (81 mg total) by mouth once daily., Disp: 30 tablet, Rfl: 0    cetirizine (ZYRTEC) 10 MG tablet, Take 1 tablet (10 mg total) by mouth once daily., Disp: 30 tablet, Rfl: 2    diclofenac (VOLTAREN) 75 MG EC tablet, Take 1 tablet (75 mg total) by mouth 2 (two) times daily., Disp: 60 tablet, Rfl: 0    methocarbamoL (ROBAXIN) 500 MG Tab, Take 1 tablet (500 mg total) by mouth every 8 (eight) hours as needed (Muscle spasm)., Disp: 30 tablet, Rfl: 0    permethrin (ELIMITE) 5 % cream, Apply topically once. for 1 dose, Disp: 60 g, Rfl: 1  [2]   Social History  Tobacco Use    Smoking status: Never    Smokeless tobacco: Current     Types: Snuff    Tobacco comments:     occasionally   Substance Use Topics    Alcohol use: Not Currently     Comment: drinks on special occasions    Drug use: Never

## 2025-03-11 NOTE — ASSESSMENT & PLAN NOTE
Patient noted to have scabies on hands and in webs of finger and noted to be spreading through his household  Permethrin cream prescribed  Patient education on medication  Zyrtec given to help with itching.

## 2025-03-11 NOTE — ASSESSMENT & PLAN NOTE
Patient s/p right knee surgery  Will prescribe diclofenac PO  Patient to follow up with orthopedics in 1 weeks

## 2025-03-18 ENCOUNTER — CLINICAL SUPPORT (OUTPATIENT)
Dept: REHABILITATION | Facility: HOSPITAL | Age: 30
End: 2025-03-18
Payer: COMMERCIAL

## 2025-03-18 DIAGNOSIS — Z98.890 S/P RIGHT KNEE SURGERY: ICD-10-CM

## 2025-03-18 DIAGNOSIS — S83.511D SPRAIN OF ANTERIOR CRUCIATE LIGAMENT OF RIGHT KNEE, SUBSEQUENT ENCOUNTER: ICD-10-CM

## 2025-03-18 DIAGNOSIS — M25.661 DECREASED ROM OF RIGHT KNEE: ICD-10-CM

## 2025-03-18 DIAGNOSIS — Z74.09 DECREASED FUNCTIONAL MOBILITY AND ENDURANCE: Primary | ICD-10-CM

## 2025-03-18 PROCEDURE — 97530 THERAPEUTIC ACTIVITIES: CPT | Mod: PN

## 2025-03-18 PROCEDURE — 97112 NEUROMUSCULAR REEDUCATION: CPT | Mod: PN

## 2025-03-18 NOTE — PROGRESS NOTES
ACL Reconstruction with Meniscal Repair                                                    Post-Operative Protocol    Due to the presence of a radial meniscus tear laterally we will remain nonweightbearing for 6 weeks!!!!  Phase I - Maximum Protection (Weeks 0 to 4):     Weeks 0 to 2:  Ice and modalities to reduce pain and inflammation  Elevate the knee above the heart for 3 to 5 days  Use crutches NWB 7-10 days then 50% WB x1week to reduce swelling. The patient may when they can ambulate without a limp.  Brace locked in full extension for 4 to 6 weeks per physician orders  Initiate patella mobility drills  Begin passive/active knee range of motion to 90° of knee flexion and strong emphasis on full knee extension  Quadriceps setting focusing on VMO contraction  Multi-plane open kinetic chain straight leg raising  Gait training     Weeks 2 to 4:  Begin open and closed kinetic chain resisted cord multi-plane hip strengthening as acute inflammation resolves  Proprioception training  Manual PNF hip and ankle patterns  Begin pool program when incision sites healed     Phase II - Progressive Stretching and Early Strengthening (Week 4 to 6):     Weeks 4 to 6:  Gradually restore full range of motion with emphasis on extension/hyperextension  Continue with ice and modalities as needed  Normalize gait  Open brace to 0° to 90° per physician's orders  Initiate lower extremity stretching program  Begin stationary bike, treadmill, and/or elliptical  as strength and swelling allow  Begin closed kinetic chain strengthening progressing from bilateral to unilateral as tolerated  Implement reintegration exercises emphasizing core stability  Advance closed kinetic chain multi-plane hip strengthening  Proprioceptive drills emphasizing neuromuscular control     Phase III - Advanced Strengthening and Proprioception Phase (Weeks 6 to 12):     Weeks 6 to 10:  Modalities as needed to control swelling  Wean out of brace weeks 6  to 8  Advance time and intensity on cardiovascular program - no running  Begin functional cord program     Weeks 10 to 12:  Initiate gym-strengthening program - Progressing form bilateral to unilateral  Leg press, squats, lunges, hamstring curls, ab/adduction, calf raises, and leg extensions (0° to 30°)  May begin outdoor biking and conservative hiking     Phase IV - Strengthening and Plyometric Phase (Weeks 12 to 20):     Weeks 12 to 20:  Implement a full gym-strengthening program  Begin pool running progressing to dry land as tolerated  Advance proprioception and begin plyometrics progressing from bilateral to unilateral as tolerated     Phase V - Return to Sport Functional Program (Week 20 to 24):     Follow-up examination with physician  Implement sport specific multi-directional drills  Implement interval functional program per physician approval  Continue with aggressive lower extremity stretching, strengthening, and cardiovascular training  Advance plyometric program as tolerated  Sports test for return to play     Outpatient Rehab    Physical Therapy Evaluation    Patient Name: Best Arguelles  MRN: [id number]  YOB: 1995  Today's Date: 3/18/2025    Therapy Diagnosis:   Encounter Diagnoses   Name Primary?    Decreased functional mobility and endurance Yes    Sprain of anterior cruciate ligament of right knee, subsequent encounter     Decreased ROM of right knee     S/P right knee surgery      Physician: Dwayne Bashir MD    Physician Orders: Eval and Treat  Medical Diagnosis: right acl, lateral meniscus repair , medial mensicus     Visit # / Visits Authorized:  1 / unlimited 80$ copay    Date of Evaluation:  3/18/2025   Insurance Authorization Period: 3/18/2025  to 12/31/2025   Plan of Care Certification:  3/18/2025 to 4/14/2025       Time In:     Time Out:    Total Time:     Total Billable Time: 45    Intake Outcome Measure for FOTO Survey    Therapist reviewed FOTO scores for Best Arguelles on  3/18/2025.   FOTO report - see Media section or FOTO account episode details.     Intake Score:  %         Subjective        Pain     Patient reports a current pain level of 0/10.                   Past Medical History/Physical Systems Review:   Best Arguelles  has no past medical history on file.    Best Arguelles  has a past surgical history that includes Laparoscopic appendectomy (N/A, 04/15/2022); Appendectomy; Reconstruction of anterior cruciate ligament using graft (Right, 2/5/2025); Arthroscopy of knee (Right, 2/5/2025); and arthroscopy,knee,with meniscus repair (Right, 2/5/2025).    Best has a current medication list which includes the following prescription(s): aspirin, cetirizine, diclofenac, methocarbamol, and oxycodone-acetaminophen.    Review of patient's allergies indicates:  No Known Allergies     Objective       Knee Range of Motion   Right Knee   Active (deg) Passive (deg) Pain   Flexion 125 130     Extension -4           Quad lag with terminal knee extension -5           Treatment:       Assessment & Plan          Patient's spiritual, cultural, and educational needs considered and patient agreeable to plan of care and goals.             Goals:   Active       long term goals         pt will progress to ambulating  without assistive device         Start:  02/17/25    Expected End:  04/14/25            pt will increase mmt to 4/5        Start:  02/17/25    Expected End:  04/14/25            pt will be ambulate without a limp        Start:  02/17/25    Expected End:  04/14/25            pt will be able to stoop and bend pain free         Start:  02/17/25    Expected End:  04/14/25               short term goals        pt will be non weight bearing x 6 weeks         Start:  02/17/25    Expected End:  03/17/25            pt will progress range of motion 0-110         Start:  02/17/25    Expected End:  03/17/25            pt will decrease quad lag to 0 degrees        Start:  02/17/25    Expected End:   03/17/25            pt will open brace o-90 degrees at 4 weeks        Start:  02/17/25    Expected End:  03/10/25              Plan   Plan of care Certification: 3/18/2025 to 4/14/2025.    Outpatient Physical Therapy 2 times weekly for 8 weeks to include the following interventions: Electrical Stimulation nmes, Manual Therapy, Neuromuscular Re-ed, Patient Education, Therapeutic Activities, and Therapeutic Exercise.     Plan of care has been reestablished with Vanna LAM,     Alex Atkinson, PT  3/18/2025          I CERTIFY THE NEED FOR THESE SERVICES FURNISHED UNDER THIS PLAN OF TREATMENT AND WHILE UNDER MY CARE.    Physician's comments:      Physician's Signature: ___________________________________________________

## 2025-03-25 ENCOUNTER — CLINICAL SUPPORT (OUTPATIENT)
Dept: REHABILITATION | Facility: HOSPITAL | Age: 30
End: 2025-03-25
Payer: COMMERCIAL

## 2025-03-25 DIAGNOSIS — M25.661 DECREASED ROM OF RIGHT KNEE: ICD-10-CM

## 2025-03-25 DIAGNOSIS — Z74.09 DECREASED FUNCTIONAL MOBILITY AND ENDURANCE: ICD-10-CM

## 2025-03-25 DIAGNOSIS — Z98.890 S/P RIGHT KNEE SURGERY: ICD-10-CM

## 2025-03-25 DIAGNOSIS — S83.511D SPRAIN OF ANTERIOR CRUCIATE LIGAMENT OF RIGHT KNEE, SUBSEQUENT ENCOUNTER: Primary | ICD-10-CM

## 2025-03-25 PROCEDURE — 97530 THERAPEUTIC ACTIVITIES: CPT | Mod: PN

## 2025-03-25 PROCEDURE — 97112 NEUROMUSCULAR REEDUCATION: CPT | Mod: PN

## 2025-03-25 NOTE — PROGRESS NOTES
ACL Reconstruction with Meniscal Repair                                                    Post-Operative Protocol     Due to the presence of a radial meniscus tear laterally we will remain nonweightbearing for 6 weeks!!!!  Phase I - Maximum Protection (Weeks 0 to 4):     Weeks 0 to 2:  Ice and modalities to reduce pain and inflammation  Elevate the knee above the heart for 3 to 5 days  Use crutches NWB 7-10 days then 50% WB x1week to reduce swelling. The patient may when they can ambulate without a limp.  Brace locked in full extension for 4 to 6 weeks per physician orders  Initiate patella mobility drills  Begin passive/active knee range of motion to 90° of knee flexion and strong emphasis on full knee extension  Quadriceps setting focusing on VMO contraction  Multi-plane open kinetic chain straight leg raising  Gait training     Weeks 2 to 4:  Begin open and closed kinetic chain resisted cord multi-plane hip strengthening as acute inflammation resolves  Proprioception training  Manual PNF hip and ankle patterns  Begin pool program when incision sites healed     Phase II - Progressive Stretching and Early Strengthening (Week 4 to 6):     Weeks 4 to 6:  Gradually restore full range of motion with emphasis on extension/hyperextension  Continue with ice and modalities as needed  Normalize gait  Open brace to 0° to 90° per physician's orders  Initiate lower extremity stretching program  Begin stationary bike, treadmill, and/or elliptical  as strength and swelling allow  Begin closed kinetic chain strengthening progressing from bilateral to unilateral as tolerated  Implement reintegration exercises emphasizing core stability  Advance closed kinetic chain multi-plane hip strengthening  Proprioceptive drills emphasizing neuromuscular control     Phase III - Advanced Strengthening and Proprioception Phase (Weeks 6 to 12):     Weeks 6 to 10:  Modalities as needed to control swelling  Wean out of brace weeks 6 to  8  Advance time and intensity on cardiovascular program - no running  Begin functional cord program     Weeks 10 to 12:  Initiate gym-strengthening program - Progressing form bilateral to unilateral  Leg press, squats, lunges, hamstring curls, ab/adduction, calf raises, and leg extensions (0° to 30°)  May begin outdoor biking and conservative hiking     Phase IV - Strengthening and Plyometric Phase (Weeks 12 to 20):     Weeks 12 to 20:  Implement a full gym-strengthening program  Begin pool running progressing to dry land as tolerated  Advance proprioception and begin plyometrics progressing from bilateral to unilateral as tolerated     Phase V - Return to Sport Functional Program (Week 20 to 24):     Follow-up examination with physician  Implement sport specific multi-directional drills  Implement interval functional program per physician approval  Continue with aggressive lower extremity stretching, strengthening, and cardiovascular training  Advance plyometric program as tolerated  Sports test for return to play     Outpatient Rehab    Physical Therapy Visit    Patient Name: Best Arguelles  MRN: [id number]  YOB: 1995  Encounter Date: 3/25/2025    Therapy Diagnosis: No diagnosis found.  Physician: Dwayne Bashir MD    Physician Orders: Eval and Treat  Medical Diagnosis: S/P right knee surgery  Sprain of anterior cruciate ligament of right knee, subsequent encounter    Visit # / Visits Authorized:  3 / 16  Insurance Authorization Period: 2/17/2025 to 4/14/2025  Date of Evaluation: 2/17/2025   Plan of Care Certification: 2/17/2025  to 4/14/2025      PT/PTA:     Number of PTA visits since last PT visit:   Time In:   1443  Time Out:  1530   Total Time:   48  Total Billable Time: 48     FOTO:  Intake Score:  %  Survey Score 1:  %  Survey Score 2:  %         Subjective   pt voices doing great , wanting to go back to work ..  Family / care giver present for this visit:   Pain reported as 0/10.      Objective        Knee Range of Motion   Right Knee   Active (deg) Passive (deg) Pain   Flexion 135 135     Extension                         Treatment:  Balance/Neuromuscular Re-Education  NMR 1: swissball knee flexion x 15  NMR 2: qs with estim x 20  NMR 3: tke es x 20  NMR 4: sl right hip abd and add  es x 10  NMR 5: static weight loads es x 10  NMR 6: slant board x 2 min with quad and glute sets c 10  Therapeutic Activity  TA 1: total gym squatting x 20 reps  simulating getting up out of chair  TA 2: total gym toe raises x 20 reps with quad set simulating raising up on tip toes  TA 3: leg press 70lb double support squatting gettin ready for work like pickin up objects x 20 reps  TA 4: biking x 5 min    Time Entry(in minutes):       Assessment & Plan   Assessment: pt improving with rom and strength       Patient will continue to benefit from skilled outpatient physical therapy to address the deficits listed in the problem list box on initial evaluation, provide pt/family education and to maximize pt's level of independence in the home and community environment.     Patient's spiritual, cultural, and educational needs considered and patient agreeable to plan of care and goals.           Plan: cont pt    Goals:   Active       long term goals         pt will progress to ambulating  without assistive device   (Progressing)       Start:  02/17/25    Expected End:  04/14/25            pt will increase mmt to 4/5  (Progressing)       Start:  02/17/25    Expected End:  04/14/25            pt will be ambulate without a limp  (Progressing)       Start:  02/17/25    Expected End:  04/14/25            pt will be able to stoop and bend pain free   (Progressing)       Start:  02/17/25    Expected End:  04/14/25               short term goals        pt will be non weight bearing x 6 weeks   (Met)       Start:  02/17/25    Expected End:  03/17/25    Resolved:  03/25/25         pt will progress range of motion 0-110   (Progressing)        Start:  02/17/25    Expected End:  03/17/25            pt will decrease quad lag to 0 degrees  (Progressing)       Start:  02/17/25    Expected End:  03/17/25            pt will open brace o-90 degrees at 4 weeks  (Progressing)       Start:  02/17/25    Expected End:  03/10/25            Plan  3/18/2025 to 4/14/2025.     Outpatient Physical Therapy 2 times weekly for 8 weeks to include the following interventions: Electrical Stimulation nmes, Manual Therapy, Neuromuscular Re-ed, Patient Education, Therapeutic Activities, and Therapeutic Exercise.     Plan of care has been reestablished with Vanna LAM and Magalie Atkinson, PT

## 2025-04-01 ENCOUNTER — CLINICAL SUPPORT (OUTPATIENT)
Dept: REHABILITATION | Facility: HOSPITAL | Age: 30
End: 2025-04-01
Payer: COMMERCIAL

## 2025-04-01 DIAGNOSIS — Z74.09 DECREASED FUNCTIONAL MOBILITY AND ENDURANCE: ICD-10-CM

## 2025-04-01 DIAGNOSIS — M25.661 DECREASED ROM OF RIGHT KNEE: ICD-10-CM

## 2025-04-01 DIAGNOSIS — S83.511D SPRAIN OF ANTERIOR CRUCIATE LIGAMENT OF RIGHT KNEE, SUBSEQUENT ENCOUNTER: Primary | ICD-10-CM

## 2025-04-01 PROCEDURE — 97112 NEUROMUSCULAR REEDUCATION: CPT | Mod: PN,CQ

## 2025-04-01 PROCEDURE — 97530 THERAPEUTIC ACTIVITIES: CPT | Mod: PN,CQ

## 2025-04-01 NOTE — PROGRESS NOTES
[facility] All[facility]L Reconstruction with Meniscal Repair                                                    Post-Operative Protocol     Due to the presence of a radial meniscus tear laterally we will remain nonweightbearing for 6 weeks!!!!  Phase I - Maximum Protection (Weeks 0 to 4):     Weeks 0 to 2:  Ice and modalities to reduce pain and inflammation  Elevate the knee above the heart for 3 to 5 days  Use crutches NWB 7-10 days then 50% WB x1week to reduce swelling. The patient may when they can ambulate without a limp.  Brace locked in full extension for 4 to 6 weeks per physician orders  Initiate patella mobility drills  Begin passive/active knee range of motion to 90° of knee flexion and strong emphasis on full knee extension  Quadriceps setting focusing on VMO contraction  Multi-plane open kinetic chain straight leg raising  Gait training     Weeks 2 to 4:  Begin open and closed kinetic chain resisted cord multi-plane hip strengthening as acute inflammation resolves  Proprioception training  Manual PNF hip and ankle patterns  Begin pool program when incision sites healed     Phase II - Progressive Stretching and Early Strengthening (Week 4 to 6):     Weeks 4 to 6:  Gradually restore full range of motion with emphasis on extension/hyperextension  Continue with ice and modalities as needed  Normalize gait  Open brace to 0° to 90° per physician's orders  Initiate lower extremity stretching program  Begin stationary bike, treadmill, and/or elliptical  as strength and swelling allow  Begin closed kinetic chain strengthening progressing from bilateral to unilateral as tolerated  Implement reintegration exercises emphasizing core stability  Advance closed kinetic chain multi-plane hip strengthening  Proprioceptive drills emphasizing neuromuscular control     Phase III - Advanced Strengthening and Proprioception Phase (Weeks 6 to 12):     Weeks 6 to 10:  Modalities as needed to control swelling  Wean out of brace  weeks 6 to 8  Advance time and intensity on cardiovascular program - no running  Begin functional cord program     Weeks 10 to 12:  Initiate gym-strengthening program - Progressing form bilateral to unilateral  Leg press, squats, lunges, hamstring curls, ab/adduction, calf raises, and leg extensions (0° to 30°)  May begin outdoor biking and conservative hiking     Phase IV - Strengthening and Plyometric Phase (Weeks 12 to 20):     Weeks 12 to 20:  Implement a full gym-strengthening program  Begin pool running progressing to dry land as tolerated  Advance proprioception and begin plyometrics progressing from bilateral to unilateral as tolerated     Phase V - Return to Sport Functional Program (Week 20 to 24):     Follow-up examination with physician  Implement sport specific multi-directional drills  Implement interval functional program per physician approval  Continue with aggressive lower extremity stretching, strengthening, and cardiovascular training  Advance plyometric program as tolerated  Sports test for return to play  Outpatient Rehab    Physical Therapy Visit    Patient Name: Best Arguelles  MRN: [id number]  YOB: 1995  Encounter Date: 4/1/2025    Therapy Diagnosis:   Encounter Diagnoses   Name Primary?    Sprain of anterior cruciate ligament of right knee, subsequent encounter Yes    Decreased ROM of right knee     Decreased functional mobility and endurance      Physician: Dwayne Bashir MD    Physician Orders: Eval and Treat  Medical Diagnosis: S/P right knee surgery  Sprain of anterior cruciate ligament of right knee, subsequent encounter    Visit # / Visits Authorized:  4 / 16  Insurance Authorization Period: 2/17/2025 to 4/14/2025  Date of Evaluation: 2/17/25  Plan of Care Certification: 2/17/25 to 4/14/25     PT/PTA: PTA   Number of PTA visits since last PT visit:1  Time In: 1440   Time Out: 1525  Total Time: 45   Total Billable Time:  45 minutes     FOTO:  Intake Score:  %  Survey Score  1:  %  Survey Score 2:  %         Subjective   no complaints, doing well..  Family / care giver present for this visit:   Pain reported as 0/10.      Objective       Knee Range of Motion   Right Knee   Active (deg) Passive (deg) Pain   Flexion 135       Extension 0                       Treatment:  Balance/Neuromuscular Re-Education  NMR 1: swissball knee flexion x 15  NMR 2: qs with estim x 20  NMR 3: tke es x 20  NMR 4: sl right hip abd and add es x 10  NMR 5: static weight loads es x 10  NMR 6: slant board x 2 min with quad and glute sets c 10  NMR 7: standing terminal knee ext blue TB 10x  Therapeutic Activity  TA 1: total gym squatting x 20 reps  simulating getting up out of chair  TA 2: total gym toe raises x 20 reps with quad set simulating raising up on tip toes  TA 3: leg press 70lb double support squatting getting ready for work like pickin up objects x 20 reps  TA 4: biking x 5 min    Time Entry(in minutes):  Neuromuscular Re-Education Time Entry: 30  Therapeutic Activity Time Entry: 15    Assessment & Plan   Assessment: recieved POC from Alex Atkinson PT. patient's AROM remains same as last assessment taken. Added standing heel raises at rail and standing terminal knee extension with resistive TB within pain free range. He reports he has a FU 4/15/25. Will continuw to progress as able.  Evaluation/Treatment Tolerance: Patient tolerated treatment well    Patient will continue to benefit from skilled outpatient physical therapy to address the deficits listed in the problem list box on initial evaluation, provide pt/family education and to maximize pt's level of independence in the home and community environment.     Patient's spiritual, cultural, and educational needs considered and patient agreeable to plan of care and goals.           Plan:      Goals:   Active       long term goals         pt will progress to ambulating  without assistive device   (Met)       Start:  02/17/25    Expected End:  04/14/25     Resolved:  04/01/25         pt will increase mmt to 4/5  (Progressing)       Start:  02/17/25    Expected End:  04/14/25            pt will be ambulate without a limp  (Progressing)       Start:  02/17/25    Expected End:  04/14/25            pt will be able to stoop and bend pain free   (Progressing)       Start:  02/17/25    Expected End:  04/14/25               short term goals        pt will be non weight bearing x 6 weeks   (Met)       Start:  02/17/25    Expected End:  03/17/25    Resolved:  03/25/25         pt will progress range of motion 0-110   (Met)       Start:  02/17/25    Expected End:  03/17/25    Resolved:  04/01/25         pt will decrease quad lag to 0 degrees  (Progressing)       Start:  02/17/25    Expected End:  03/17/25            pt will open brace o-90 degrees at 4 weeks  (Met)       Start:  02/17/25    Expected End:  03/10/25    Resolved:  04/01/25           Plan  3/18/2025 to 4/14/2025.  Outpatient Physical Therapy 2 times weekly for 8 weeks to include the following interventions: Electrical Stimulation nmes, Manual Therapy, Neuromuscular Re-ed, Patient Education, Therapeutic Activities, and Therapeutic Exercise.  Magalie Schmitt, PTA

## 2025-04-08 ENCOUNTER — CLINICAL SUPPORT (OUTPATIENT)
Dept: REHABILITATION | Facility: HOSPITAL | Age: 30
End: 2025-04-08
Payer: COMMERCIAL

## 2025-04-08 DIAGNOSIS — Z98.890 S/P RIGHT KNEE SURGERY: ICD-10-CM

## 2025-04-08 DIAGNOSIS — M25.661 DECREASED ROM OF RIGHT KNEE: Primary | ICD-10-CM

## 2025-04-08 PROCEDURE — 97112 NEUROMUSCULAR REEDUCATION: CPT | Mod: PN,CQ

## 2025-04-08 PROCEDURE — 97530 THERAPEUTIC ACTIVITIES: CPT | Mod: PN,CQ

## 2025-04-08 NOTE — PROGRESS NOTES
Post-Operative Protocol     Due to the presence of a radial meniscus tear laterally we will remain nonweightbearing for 6 weeks!!!!  Phase I - Maximum Protection (Weeks 0 to 4):     Weeks 0 to 2:  Ice and modalities to reduce pain and inflammation  Elevate the knee above the heart for 3 to 5 days  Use crutches NWB 7-10 days then 50% WB x1week to reduce swelling. The patient may when they can ambulate without a limp.  Brace locked in full extension for 4 to 6 weeks per physician orders  Initiate patella mobility drills  Begin passive/active knee range of motion to 90° of knee flexion and strong emphasis on full knee extension  Quadriceps setting focusing on VMO contraction  Multi-plane open kinetic chain straight leg raising  Gait training     Weeks 2 to 4:  Begin open and closed kinetic chain resisted cord multi-plane hip strengthening as acute inflammation resolves  Proprioception training  Manual PNF hip and ankle patterns  Begin pool program when incision sites healed     Phase II - Progressive Stretching and Early Strengthening (Week 4 to 6):     Weeks 4 to 6:  Gradually restore full range of motion with emphasis on extension/hyperextension  Continue with ice and modalities as needed  Normalize gait  Open brace to 0° to 90° per physician's orders  Initiate lower extremity stretching program  Begin stationary bike, treadmill, and/or elliptical  as strength and swelling allow  Begin closed kinetic chain strengthening progressing from bilateral to unilateral as tolerated  Implement reintegration exercises emphasizing core stability  Advance closed kinetic chain multi-plane hip strengthening  Proprioceptive drills emphasizing neuromuscular control     Phase III - Advanced Strengthening and Proprioception Phase (Weeks 6 to 12):     Weeks 6 to 10:  Modalities as needed to control swelling  Wean out of brace weeks 6 to 8  Advance time and intensity on cardiovascular  program - no running  Begin functional cord program     Weeks 10 to 12:  Initiate gym-strengthening program - Progressing form bilateral to unilateral  Leg press, squats, lunges, hamstring curls, ab/adduction, calf raises, and leg extensions (0° to 30°)  May begin outdoor biking and conservative hiking     Phase IV - Strengthening and Plyometric Phase (Weeks 12 to 20):     Weeks 12 to 20:  Implement a full gym-strengthening program  Begin pool running progressing to dry land as tolerated  Advance proprioception and begin plyometrics progressing from bilateral to unilateral as tolerated     Phase V - Return to Sport Functional Program (Week 20 to 24):     Follow-up examination with physician  Implement sport specific multi-directional drills  Implement interval functional program per physician approval  Continue with aggressive lower extremity stretching, strengthening, and cardiovascular training  Advance plyometric program as tolerated  Sports test for return to play  Outpatient Rehab    Physical Therapy Visit    Patient Name: Best Arguelles  MRN: [id number]  YOB: 1995  Encounter Date: 4/8/2025    Therapy Diagnosis:   Encounter Diagnoses   Name Primary?    Decreased ROM of right knee Yes    S/P right knee surgery      Physician: Dwayne Bashir MD    Physician Orders: Eval and Treat  Medical Diagnosis: S/P right knee surgery  Sprain of anterior cruciate ligament of right knee, subsequent encounter    Visit # / Visits Authorized:  5 / 16  Pta visit# 2  Insurance Authorization Period: 2/17/2025 to 4/14/2025    Time In:   245  Time Out:  330  Total Time:     Total Billable Time: 45     FOTO:  Intake Score:  %  Survey Score 1: 69 %  Survey Score 2:  %         Subjective   I'm ready to go back to work, I see  next week.  Family / care giver present for this visit:   Pain reported as 0/10.      Objective       Knee Range of Motion   Right Knee   Active (deg) Passive (deg) Pain   Flexion 130       Extension -2  "          Quad lag with terminal knee extension -3           Treatment:  Balance/Neuromuscular Re-Education  NMR 1: swissball knee flexion x 10  NMR 2: qs with estim x 20  NMR 3: tke es 15 x 5#  NMR 6: slant board x 2 min with quad and glute sets c 10  NMR 7: 6" leg lifts es x 10  Therapeutic Activity  TA 1: total gym squatting x 20 reps  simulating getting up out of chair  TA 2: total gym toe raises x 20 reps with quad set simulating raising up on tip toes  TA 3: leg press 70lb double support squatting getting ready for work like pickin up objects x 20 reps  TA 4: biking x 5 min  TA 5: stairs up/down x 5 sets, reverse step downs onto left le to simulate climbing equipment when rtw  TA 6: ds calf presses 20 x 70# to simulate reaching on tip toes  TA 7: stairclimber x 2' to simulate climbing community steps/hills    Time Entry(in minutes):  Neuromuscular Re-Education Time Entry: 20  Therapeutic Activity Time Entry: 25    Assessment & Plan   Assessment: pt fatigued quickly with stairclimber, instructed to begin walking program to progress with endurance       Patient will continue to benefit from skilled outpatient physical therapy to address the deficits listed in the problem list box on initial evaluation, provide pt/family education and to maximize pt's level of independence in the home and community environment.     Patient's spiritual, cultural, and educational needs considered and patient agreeable to plan of care and goals.               Goals:   Active       long term goals         pt will progress to ambulating  without assistive device   (Met)       Start:  02/17/25    Expected End:  04/14/25    Resolved:  04/01/25         pt will increase mmt to 4/5  (Progressing)       Start:  02/17/25    Expected End:  04/14/25            pt will be ambulate without a limp  (Progressing)       Start:  02/17/25    Expected End:  04/14/25            pt will be able to stoop and bend pain free   (Progressing)       Start:  " 02/17/25    Expected End:  04/14/25               short term goals        pt will be non weight bearing x 6 weeks   (Met)       Start:  02/17/25    Expected End:  03/17/25    Resolved:  03/25/25         pt will progress range of motion 0-110   (Met)       Start:  02/17/25    Expected End:  03/17/25    Resolved:  04/01/25         pt will decrease quad lag to 0 degrees  (Progressing)       Start:  02/17/25    Expected End:  03/17/25            pt will open brace o-90 degrees at 4 weeks  (Met)       Start:  02/17/25    Expected End:  03/10/25    Resolved:  04/01/25         3/18/2025 to 4/14/2025.  Outpatient Physical Therapy 2 times weekly for 8 weeks to include the following interventions: Electrical Stimulation nmes, Manual Therapy, Neuromuscular Re-ed, Patient Education, Therapeutic Activities, and Therapeutic Exercise.    Adriana Esparza, PTA

## (undated) DEVICE — TROCAR GELPORT BLUNT BALLOON SYSTEM 10/12X100MM LF

## (undated) DEVICE — SPONGE COTTON TRAY 4X4IN

## (undated) DEVICE — SOL NACL INJ 1000ML 0.9%

## (undated) DEVICE — SUT VICRYL 2-0 36 CT-1

## (undated) DEVICE — GLOVE SENSICARE PI GRN 8

## (undated) DEVICE — GLOVE SURGICAL PROTEXIS PI SIZE 6

## (undated) DEVICE — SAWBLADE TRANS TIB ACL

## (undated) DEVICE — Device

## (undated) DEVICE — SUT 2-0 VICRYL / CT-1

## (undated) DEVICE — PUSHER NOVOCUT SUTURE MANAGER

## (undated) DEVICE — CDS LAP CHOLE

## (undated) DEVICE — HARVESTER QUADPRO 11MM

## (undated) DEVICE — PAD ABDOMINAL 8X7.5 STERILE

## (undated) DEVICE — GLOVE SENSICARE PI SURG 6.5

## (undated) DEVICE — NOVOSTITCH CARTRIDGE 0
Type: IMPLANTABLE DEVICE | Site: KNEE | Status: NON-FUNCTIONAL
Brand: NOVOSTITCH
Removed: 2025-02-05

## (undated) DEVICE — MARKER SKIN RULER AND LABEL

## (undated) DEVICE — SUT ETHILON 3-0 PS2 18 BLK

## (undated) DEVICE — SUT MONOCRYL 3-0 PS-2 UND

## (undated) DEVICE — DRESSING GAUZE XEROFORM 5X9

## (undated) DEVICE — COVER PROXIMA MAYO STAND

## (undated) DEVICE — TUBE SUCTION MEDI-VAC STERILE

## (undated) DEVICE — SUTURE PDS II 0 VIOLET 27 CT2

## (undated) DEVICE — GOWN POLY REINF BRTH SLV XL

## (undated) DEVICE — BLADE SURG #15 CARBON STEEL

## (undated) DEVICE — NOVOSTITCH PRO MENISCAL RPR SYS 0
Type: IMPLANTABLE DEVICE | Site: KNEE | Status: NON-FUNCTIONAL
Brand: NOVOSTITCH
Removed: 2025-02-05

## (undated) DEVICE — TROCAR SLEEVE Z-THREAD 5MM X 100MM

## (undated) DEVICE — DEVICE SUCTION H20 BROOM 12FT

## (undated) DEVICE — BANDAGE PRCAR COMPR 11YDX6IN

## (undated) DEVICE — NDL QUINCKE SPINAL 18G 3.5IN

## (undated) DEVICE — GLOVE SENSICARE PI GRN 7

## (undated) DEVICE — SHAVER SYNNOVATOR PLAT SERIES 4.5MM

## (undated) DEVICE — STRIP WOUND CLOSURE 1/4 IN X 4 IN

## (undated) DEVICE — PENCIL ZIP PEN SMOKE EVAC 10FT

## (undated) DEVICE — APPLICATOR CHLORAPREP ORN 26ML

## (undated) DEVICE — GLOVE SURGICAL PROTEXIS PI SIZE 8.0

## (undated) DEVICE — KNIFE GRAFT ACL 9MM

## (undated) DEVICE — ADHESIVE MASTISOL VIAL 48/BX

## (undated) DEVICE — TUBING CROSSFLOW INFLOW CASS

## (undated) DEVICE — SOL NACL IRR 1000ML BTL

## (undated) DEVICE — CUTTER/TENSIONER SUTURE KNEES

## (undated) DEVICE — ENDO POUCH

## (undated) DEVICE — BLADE INCISOR 4.5MM

## (undated) DEVICE — BLADE BONECUTTER PLAT 5.5MM

## (undated) DEVICE — GLOVE SENSICARE PI GRN 6.5

## (undated) DEVICE — SKID PORTAL

## (undated) DEVICE — CUTTER LINEAR ARTIC 45MM STAPLER

## (undated) DEVICE — DRAPE ARTHSCP T ORTHOMAX POUCH

## (undated) DEVICE — GLOVE SURGICAL PROTEXIS PI SIZE 6.5

## (undated) DEVICE — NOVOSTITCH PRO MEN RPR SYS 2-0
Type: IMPLANTABLE DEVICE | Site: KNEE | Status: NON-FUNCTIONAL
Brand: NOVOSTITCH
Removed: 2025-02-05

## (undated) DEVICE — PACK ECLIPSE BASIC III SURG

## (undated) DEVICE — GOWN NONREINF SET-IN SLV 2XL

## (undated) DEVICE — TRAY FOLEY CATH 16FR SILICONE LUBRI-SIL W/DRAINAGE BAG

## (undated) DEVICE — STRIP MEDI WND CLSR 1/2X4IN

## (undated) DEVICE — LAPARSCOPIC LIGASURE BLUNT TIP SEALER

## (undated) DEVICE — GLOVE SENSICARE PI SURG 7

## (undated) DEVICE — GLOVE SURGICAL PROTEXIS PI BLUE SIZE 7.0

## (undated) DEVICE — WAND COBLATION WEREWOLF FLOW 50

## (undated) DEVICE — GLOVE SENSICARE PI GRN 7.5

## (undated) DEVICE — TOURNIQUET SB QC SP 34X4IN

## (undated) DEVICE — SYR IRRIGATION BULB STER 60ML

## (undated) DEVICE — CLOSURE SKIN STERI STRIP 1/2X4

## (undated) DEVICE — QUAD TENDON GRAFT CUTTING BLADE 10MM

## (undated) DEVICE — SUT BLU BR 2 TAPERD NDL 1/2

## (undated) DEVICE — TROCAR BLADELESS Z-THREAD 5MM X 100MM

## (undated) DEVICE — GLOVE SENSICARE PI SURG 7.5

## (undated) DEVICE — DRAPE INVISISHIELD U 48X52IN

## (undated) DEVICE — WARMER SCOPE DISP

## (undated) DEVICE — CANISTER SUCTION MEDI-VAC 12L

## (undated) DEVICE — PACK KNEE ARTHROSCOPY  RUSH

## (undated) DEVICE — PAD SUREFIT GRND ELECTRD 10FT

## (undated) DEVICE — CUTTER LINEAR RELOAD 45MM